# Patient Record
Sex: MALE | Race: WHITE
[De-identification: names, ages, dates, MRNs, and addresses within clinical notes are randomized per-mention and may not be internally consistent; named-entity substitution may affect disease eponyms.]

---

## 2020-01-15 ENCOUNTER — HOSPITAL ENCOUNTER (INPATIENT)
Dept: HOSPITAL 54 - TELE | Age: 50
LOS: 2 days | Discharge: HOME | DRG: 140 | End: 2020-01-17
Attending: NURSE PRACTITIONER | Admitting: NURSE PRACTITIONER
Payer: MEDICAID

## 2020-01-15 VITALS — SYSTOLIC BLOOD PRESSURE: 145 MMHG | DIASTOLIC BLOOD PRESSURE: 82 MMHG

## 2020-01-15 VITALS — HEIGHT: 71 IN | BODY MASS INDEX: 27.02 KG/M2 | WEIGHT: 193 LBS

## 2020-01-15 DIAGNOSIS — J44.1: Primary | ICD-10-CM

## 2020-01-15 DIAGNOSIS — Z83.3: ICD-10-CM

## 2020-01-15 DIAGNOSIS — J40: ICD-10-CM

## 2020-01-15 DIAGNOSIS — J15.9: ICD-10-CM

## 2020-01-15 DIAGNOSIS — Z82.5: ICD-10-CM

## 2020-01-15 DIAGNOSIS — Z87.891: ICD-10-CM

## 2020-01-15 DIAGNOSIS — Z98.890: ICD-10-CM

## 2020-01-15 PROCEDURE — G0378 HOSPITAL OBSERVATION PER HR: HCPCS

## 2020-01-15 NOTE — NUR
ADMISSION

48 y/o male admitted for shortness of breath. Place on 2LPM via NC, denies anxiety. 
Ambulates independently. Skin intact. Orientation to room, unit, staff. Instructed to use 
call light for assistance, verbalized understanding.

## 2020-01-16 VITALS — SYSTOLIC BLOOD PRESSURE: 144 MMHG | DIASTOLIC BLOOD PRESSURE: 87 MMHG

## 2020-01-16 VITALS — DIASTOLIC BLOOD PRESSURE: 80 MMHG | SYSTOLIC BLOOD PRESSURE: 139 MMHG

## 2020-01-16 VITALS — SYSTOLIC BLOOD PRESSURE: 132 MMHG | DIASTOLIC BLOOD PRESSURE: 87 MMHG

## 2020-01-16 VITALS — SYSTOLIC BLOOD PRESSURE: 133 MMHG | DIASTOLIC BLOOD PRESSURE: 80 MMHG

## 2020-01-16 LAB
APPEARANCE UR: CLEAR
BASOPHILS # BLD AUTO: 0.1 /CMM (ref 0–0.2)
BASOPHILS NFR BLD AUTO: 0.4 % (ref 0–2)
BILIRUB UR QL STRIP: NEGATIVE
BUN SERPL-MCNC: 15 MG/DL (ref 7–18)
CALCIUM SERPL-MCNC: 8.6 MG/DL (ref 8.5–10.1)
CHLORIDE SERPL-SCNC: 106 MMOL/L (ref 98–107)
CHOLEST SERPL-MCNC: 179 MG/DL (ref ?–200)
CO2 SERPL-SCNC: 27 MMOL/L (ref 21–32)
COLOR UR: YELLOW
CREAT SERPL-MCNC: 0.7 MG/DL (ref 0.6–1.3)
EOSINOPHIL NFR BLD AUTO: 0 % (ref 0–6)
GLUCOSE SERPL-MCNC: 100 MG/DL (ref 74–106)
GLUCOSE UR STRIP-MCNC: (no result) MG/DL
HCT VFR BLD AUTO: 46 % (ref 39–51)
HDLC SERPL-MCNC: 89 MG/DL (ref 40–60)
HGB BLD-MCNC: 15.2 G/DL (ref 13.5–17.5)
HGB UR QL STRIP: NEGATIVE ERY/UL
KETONES UR STRIP-MCNC: NEGATIVE MG/DL
LDLC SERPL DIRECT ASSAY-MCNC: 83 MG/DL (ref 0–99)
LEUKOCYTE ESTERASE UR QL STRIP: NEGATIVE
LYMPHOCYTES NFR BLD AUTO: 0.9 /CMM (ref 0.8–4.8)
LYMPHOCYTES NFR BLD AUTO: 5 % (ref 20–44)
MAGNESIUM SERPL-MCNC: 2.2 MG/DL (ref 1.8–2.4)
MCHC RBC AUTO-ENTMCNC: 33 G/DL (ref 31–36)
MCV RBC AUTO: 88 FL (ref 80–96)
MONOCYTES NFR BLD AUTO: 0.8 /CMM (ref 0.1–1.3)
MONOCYTES NFR BLD AUTO: 4.2 % (ref 2–12)
NEUTROPHILS # BLD AUTO: 16.4 /CMM (ref 1.8–8.9)
NEUTROPHILS NFR BLD AUTO: 90.4 % (ref 43–81)
NITRITE UR QL STRIP: NEGATIVE
PH UR STRIP: 6 [PH] (ref 5–8)
PHOSPHATE SERPL-MCNC: 3.9 MG/DL (ref 2.5–4.9)
PLATELET # BLD AUTO: 365 /CMM (ref 150–450)
POTASSIUM SERPL-SCNC: 4.1 MMOL/L (ref 3.5–5.1)
PROT UR QL STRIP: NEGATIVE MG/DL
RBC # BLD AUTO: 5.23 MIL/UL (ref 4.5–6)
SODIUM SERPL-SCNC: 143 MMOL/L (ref 136–145)
TRIGL SERPL-MCNC: 45 MG/DL (ref 30–150)
TSH SERPL DL<=0.005 MIU/L-ACNC: 1.85 UIU/ML (ref 0.36–3.74)
UROBILINOGEN UR STRIP-MCNC: 0.2 EU/DL
WBC NRBC COR # BLD AUTO: 18.2 K/UL (ref 4.3–11)

## 2020-01-16 RX ADMIN — AZITHROMYCIN DIHYDRATE SCH MG: 250 TABLET, FILM COATED ORAL at 12:01

## 2020-01-16 RX ADMIN — ALBUTEROL SULFATE SCH MG: 2.5 SOLUTION RESPIRATORY (INHALATION) at 20:22

## 2020-01-16 RX ADMIN — ENOXAPARIN SODIUM SCH MG: 40 INJECTION SUBCUTANEOUS at 21:47

## 2020-01-16 RX ADMIN — ENOXAPARIN SODIUM SCH MG: 40 INJECTION SUBCUTANEOUS at 01:27

## 2020-01-16 RX ADMIN — ALBUTEROL SULFATE SCH MG: 2.5 SOLUTION RESPIRATORY (INHALATION) at 11:46

## 2020-01-16 RX ADMIN — Medication SCH MG: at 20:22

## 2020-01-16 RX ADMIN — DEXTROSE MONOHYDRATE SCH MLS/HR: 50 INJECTION, SOLUTION INTRAVENOUS at 11:53

## 2020-01-16 RX ADMIN — Medication SCH MG: at 16:31

## 2020-01-16 RX ADMIN — Medication SCH MG: at 08:12

## 2020-01-16 RX ADMIN — Medication SCH MG: at 11:46

## 2020-01-16 RX ADMIN — ALBUTEROL SULFATE SCH MG: 2.5 SOLUTION RESPIRATORY (INHALATION) at 16:31

## 2020-01-16 RX ADMIN — ALBUTEROL SULFATE SCH MG: 2.5 SOLUTION RESPIRATORY (INHALATION) at 08:12

## 2020-01-16 NOTE — NUR
END OF SHIFT REPORT

Patient in bed, awake, A/O x4. On supplemental Oxygen at 2LPM via NC, shortness of breath 
with exertion, denies at rest. Ambulates independently, voiding without difficulty. Lab 
today as planned, MRSA surveillance specimen send to lab. Will endorse to Oncoming RN.

## 2020-01-16 NOTE — NUR
CHANGE OF SHIFT REPORT

Patient in bed, awake. On supplemental Oxygen at 2LPM via NC, tolerating well. Appears 
comfortable, denies chest pain. Urine, sputum specimen to be collected. Maintained safety.

## 2020-01-16 NOTE — NUR
Patient lives with family in [place]. He is ambulatory and independent with adl's. Has 
no DME or homehealth reported. His family will provide ride when discharge. 

-------------------------------------------------------------------------------

Addendum: 01/16/20 at 2131 by OC BROOKE RN

-------------------------------------------------------------------------------

Amended: Links added.

## 2020-01-17 VITALS — SYSTOLIC BLOOD PRESSURE: 116 MMHG | DIASTOLIC BLOOD PRESSURE: 77 MMHG

## 2020-01-17 LAB
BASOPHILS # BLD AUTO: 0 /CMM (ref 0–0.2)
BASOPHILS NFR BLD AUTO: 0.1 % (ref 0–2)
BUN SERPL-MCNC: 20 MG/DL (ref 7–18)
CALCIUM SERPL-MCNC: 8.8 MG/DL (ref 8.5–10.1)
CHLORIDE SERPL-SCNC: 101 MMOL/L (ref 98–107)
CO2 SERPL-SCNC: 27 MMOL/L (ref 21–32)
CREAT SERPL-MCNC: 0.7 MG/DL (ref 0.6–1.3)
EOSINOPHIL NFR BLD AUTO: 0 % (ref 0–6)
GLUCOSE SERPL-MCNC: 111 MG/DL (ref 74–106)
HCT VFR BLD AUTO: 47 % (ref 39–51)
HGB BLD-MCNC: 15.6 G/DL (ref 13.5–17.5)
LYMPHOCYTES NFR BLD AUTO: 1 /CMM (ref 0.8–4.8)
LYMPHOCYTES NFR BLD AUTO: 5.2 % (ref 20–44)
MAGNESIUM SERPL-MCNC: 2.2 MG/DL (ref 1.8–2.4)
MCHC RBC AUTO-ENTMCNC: 33 G/DL (ref 31–36)
MCV RBC AUTO: 88 FL (ref 80–96)
MONOCYTES NFR BLD AUTO: 0.8 /CMM (ref 0.1–1.3)
MONOCYTES NFR BLD AUTO: 4.1 % (ref 2–12)
NEUTROPHILS # BLD AUTO: 17.6 /CMM (ref 1.8–8.9)
NEUTROPHILS NFR BLD AUTO: 90.6 % (ref 43–81)
PHOSPHATE SERPL-MCNC: 3 MG/DL (ref 2.5–4.9)
PLATELET # BLD AUTO: 373 /CMM (ref 150–450)
POTASSIUM SERPL-SCNC: 4.2 MMOL/L (ref 3.5–5.1)
RBC # BLD AUTO: 5.33 MIL/UL (ref 4.5–6)
SODIUM SERPL-SCNC: 137 MMOL/L (ref 136–145)
WBC NRBC COR # BLD AUTO: 19.5 K/UL (ref 4.3–11)

## 2020-01-17 RX ADMIN — AZITHROMYCIN DIHYDRATE SCH MG: 250 TABLET, FILM COATED ORAL at 11:53

## 2020-01-17 RX ADMIN — Medication SCH MG: at 08:27

## 2020-01-17 RX ADMIN — DEXTROSE MONOHYDRATE SCH MLS/HR: 50 INJECTION, SOLUTION INTRAVENOUS at 11:06

## 2020-01-17 RX ADMIN — Medication SCH MG: at 11:57

## 2020-01-17 RX ADMIN — ALBUTEROL SULFATE SCH MG: 2.5 SOLUTION RESPIRATORY (INHALATION) at 08:27

## 2020-01-17 RX ADMIN — ALBUTEROL SULFATE SCH MG: 2.5 SOLUTION RESPIRATORY (INHALATION) at 11:57

## 2020-01-17 NOTE — NUR
m/s lvn: discharged



discharged home accompanied by father via private car in stable condition with all valuables 
and d'c papers.

## 2020-01-17 NOTE — NUR
ELIZABETHW and pt's RN Olvin met with the pt. bedside to discuss discharge plan. Pt. denies being 
homeless and lives with this mother in [place]. Pt stated, his mother will be coming to 
pick him up.

## 2020-01-17 NOTE — NUR
m/s lvn: md visit



seen and examined by natalee montalvo (acnp) at this time. pt for discharge planning home 
today. discharge instructions with prescriptions given by natalee pt verbalized 
understanding. informed pt to wait to get his flu vaccine once he feels better per joseph, pt 
verbalized understanding.

## 2020-01-17 NOTE — NUR
m/s lvn: notes



discharge instructions given with e script prescriptions to pt and verbalized understanding. 
mother will pick him up in an hour per telephone conversation. pt made aware.

## 2020-01-17 NOTE — NUR
m/s lvn: notes



father here to  the pt, mother couldn't make it due to previous appointment per 
family. h/l removed with tip intact. pt getting ready. all belongings/valuables returned to 
pt.

## 2020-01-17 NOTE — NUR
m/s lvn: initial assessment



received pt in bed awake, a/ox4. no c/o pain, sob, or any discomfort. pt feels much better 
as stated. no wheezing to todd lobes noted at this time. instructed to call for assistance. 
will continue to monitor.

## 2020-01-17 NOTE — NUR
m/s lvn: notes



still awaiting for family to pick him up. pt resting comfortable. no distress noted.

## 2020-01-17 NOTE — NUR
END OF SHIFT REPORT

Patient in bed, awake, A/O x4. Remains on supplemental Oxygen at 2LPM via NC, denies 
shortness of breath at rest and with exertion. IV antibiotic as scheduled, no acute events 
overnight. Respiratory cx G/S pending, sputum not available at this time. Will endorse to 
Oncoming RN.

## 2020-04-01 ENCOUNTER — HOSPITAL ENCOUNTER (EMERGENCY)
Dept: HOSPITAL 54 - ER | Age: 50
Discharge: HOME | End: 2020-04-01
Payer: MEDICAID

## 2020-04-01 VITALS — DIASTOLIC BLOOD PRESSURE: 85 MMHG | SYSTOLIC BLOOD PRESSURE: 139 MMHG

## 2020-04-01 VITALS — BODY MASS INDEX: 28.14 KG/M2 | HEIGHT: 71 IN | WEIGHT: 201 LBS

## 2020-04-01 DIAGNOSIS — Z79.899: ICD-10-CM

## 2020-04-01 DIAGNOSIS — R06.00: Primary | ICD-10-CM

## 2020-04-01 LAB
ALBUMIN SERPL BCP-MCNC: 3.5 G/DL (ref 3.4–5)
ALP SERPL-CCNC: 72 U/L (ref 46–116)
ALT SERPL W P-5'-P-CCNC: 13 U/L (ref 12–78)
AST SERPL W P-5'-P-CCNC: 3 U/L (ref 15–37)
BASOPHILS # BLD AUTO: 0.1 /CMM (ref 0–0.2)
BASOPHILS NFR BLD AUTO: 1.3 % (ref 0–2)
BILIRUB DIRECT SERPL-MCNC: 0.1 MG/DL (ref 0–0.2)
BILIRUB SERPL-MCNC: 0.5 MG/DL (ref 0.2–1)
BUN SERPL-MCNC: 17 MG/DL (ref 7–18)
CALCIUM SERPL-MCNC: 9 MG/DL (ref 8.5–10.1)
CHLORIDE SERPL-SCNC: 106 MMOL/L (ref 98–107)
CO2 SERPL-SCNC: 32 MMOL/L (ref 21–32)
CREAT SERPL-MCNC: 1.3 MG/DL (ref 0.6–1.3)
EOSINOPHIL NFR BLD AUTO: 6.7 % (ref 0–6)
GLUCOSE SERPL-MCNC: 170 MG/DL (ref 74–106)
HCT VFR BLD AUTO: 44 % (ref 39–51)
HGB BLD-MCNC: 15.1 G/DL (ref 13.5–17.5)
LYMPHOCYTES NFR BLD AUTO: 1.7 /CMM (ref 0.8–4.8)
LYMPHOCYTES NFR BLD AUTO: 18.6 % (ref 20–44)
MCHC RBC AUTO-ENTMCNC: 34 G/DL (ref 31–36)
MCV RBC AUTO: 88 FL (ref 80–96)
MONOCYTES NFR BLD AUTO: 0.5 /CMM (ref 0.1–1.3)
MONOCYTES NFR BLD AUTO: 5.6 % (ref 2–12)
NEUTROPHILS # BLD AUTO: 6.3 /CMM (ref 1.8–8.9)
NEUTROPHILS NFR BLD AUTO: 67.8 % (ref 43–81)
NT-PROBNP SERPL-MCNC: 46 PG/ML (ref 0–125)
PLATELET # BLD AUTO: 346 /CMM (ref 150–450)
POTASSIUM SERPL-SCNC: 3 MMOL/L (ref 3.5–5.1)
PROT SERPL-MCNC: 7.2 G/DL (ref 6.4–8.2)
RBC # BLD AUTO: 5.05 MIL/UL (ref 4.5–6)
SODIUM SERPL-SCNC: 142 MMOL/L (ref 136–145)
WBC NRBC COR # BLD AUTO: 9.2 K/UL (ref 4.3–11)

## 2020-04-01 PROCEDURE — 71045 X-RAY EXAM CHEST 1 VIEW: CPT

## 2020-04-01 PROCEDURE — 83880 ASSAY OF NATRIURETIC PEPTIDE: CPT

## 2020-04-01 PROCEDURE — 85025 COMPLETE CBC W/AUTO DIFF WBC: CPT

## 2020-04-01 PROCEDURE — 80076 HEPATIC FUNCTION PANEL: CPT

## 2020-04-01 PROCEDURE — 93005 ELECTROCARDIOGRAM TRACING: CPT

## 2020-04-01 PROCEDURE — 99285 EMERGENCY DEPT VISIT HI MDM: CPT

## 2020-04-01 PROCEDURE — 36415 COLL VENOUS BLD VENIPUNCTURE: CPT

## 2020-04-01 PROCEDURE — 80048 BASIC METABOLIC PNL TOTAL CA: CPT

## 2020-04-01 PROCEDURE — 84484 ASSAY OF TROPONIN QUANT: CPT

## 2020-04-16 ENCOUNTER — HOSPITAL ENCOUNTER (EMERGENCY)
Dept: HOSPITAL 54 - ER | Age: 50
Discharge: HOME | End: 2020-04-16
Payer: MEDICAID

## 2020-04-16 VITALS — DIASTOLIC BLOOD PRESSURE: 79 MMHG | SYSTOLIC BLOOD PRESSURE: 131 MMHG

## 2020-04-16 VITALS — WEIGHT: 210 LBS | HEIGHT: 71 IN | BODY MASS INDEX: 29.4 KG/M2

## 2020-04-16 DIAGNOSIS — Z79.899: ICD-10-CM

## 2020-04-16 DIAGNOSIS — J44.9: Primary | ICD-10-CM

## 2020-04-16 LAB
ALBUMIN SERPL BCP-MCNC: 4 G/DL (ref 3.4–5)
ALP SERPL-CCNC: 77 U/L (ref 46–116)
ALT SERPL W P-5'-P-CCNC: 37 U/L (ref 12–78)
AST SERPL W P-5'-P-CCNC: 22 U/L (ref 15–37)
BASE EXCESS BLDA CALC-SCNC: -3.7 MMOL/L
BASOPHILS # BLD AUTO: 0.1 /CMM (ref 0–0.2)
BASOPHILS NFR BLD AUTO: 0.8 % (ref 0–2)
BILIRUB DIRECT SERPL-MCNC: 0.1 MG/DL (ref 0–0.2)
BILIRUB SERPL-MCNC: 0.9 MG/DL (ref 0.2–1)
BUN SERPL-MCNC: 24 MG/DL (ref 7–18)
CALCIUM SERPL-MCNC: 9.6 MG/DL (ref 8.5–10.1)
CHLORIDE SERPL-SCNC: 99 MMOL/L (ref 98–107)
CO2 SERPL-SCNC: 26 MMOL/L (ref 21–32)
CREAT SERPL-MCNC: 1.4 MG/DL (ref 0.6–1.3)
DO-HGB MFR BLDA: 46.7 MMHG
EOSINOPHIL NFR BLD AUTO: 6 % (ref 0–6)
GLUCOSE SERPL-MCNC: 144 MG/DL (ref 74–106)
HCT VFR BLD AUTO: 52 % (ref 39–51)
HGB BLD-MCNC: 17.3 G/DL (ref 13.5–17.5)
INHALED O2 CONCENTRATION: 21 %
LYMPHOCYTES NFR BLD AUTO: 1.3 /CMM (ref 0.8–4.8)
LYMPHOCYTES NFR BLD AUTO: 10.7 % (ref 20–44)
MCHC RBC AUTO-ENTMCNC: 33 G/DL (ref 31–36)
MCV RBC AUTO: 88 FL (ref 80–96)
MONOCYTES NFR BLD AUTO: 0.8 /CMM (ref 0.1–1.3)
MONOCYTES NFR BLD AUTO: 7 % (ref 2–12)
NEUTROPHILS # BLD AUTO: 9.1 /CMM (ref 1.8–8.9)
NEUTROPHILS NFR BLD AUTO: 75.5 % (ref 43–81)
PCO2 TEMP ADJ BLDA: 34.8 MMHG (ref 35–45)
PH TEMP ADJ BLDA: 7.39 [PH] (ref 7.35–7.45)
PLATELET # BLD AUTO: 343 /CMM (ref 150–450)
PO2 TEMP ADJ BLDA: 61.4 MMHG (ref 75–100)
POTASSIUM SERPL-SCNC: 3.6 MMOL/L (ref 3.5–5.1)
PROT SERPL-MCNC: 8.1 G/DL (ref 6.4–8.2)
RBC # BLD AUTO: 5.98 MIL/UL (ref 4.5–6)
SAO2 % BLDA: 92.6 % (ref 92–98.5)
SODIUM SERPL-SCNC: 138 MMOL/L (ref 136–145)
VENTILATION MODE VENT: (no result)
WBC NRBC COR # BLD AUTO: 12 K/UL (ref 4.3–11)

## 2020-04-16 PROCEDURE — 93005 ELECTROCARDIOGRAM TRACING: CPT

## 2020-04-16 PROCEDURE — 36415 COLL VENOUS BLD VENIPUNCTURE: CPT

## 2020-04-16 PROCEDURE — 71045 X-RAY EXAM CHEST 1 VIEW: CPT

## 2020-04-16 PROCEDURE — 94644 CONT INHLJ TX 1ST HOUR: CPT

## 2020-04-16 PROCEDURE — 84484 ASSAY OF TROPONIN QUANT: CPT

## 2020-04-16 PROCEDURE — 82803 BLOOD GASES ANY COMBINATION: CPT

## 2020-04-16 PROCEDURE — 96374 THER/PROPH/DIAG INJ IV PUSH: CPT

## 2020-04-16 PROCEDURE — 36600 WITHDRAWAL OF ARTERIAL BLOOD: CPT

## 2020-04-16 PROCEDURE — 99285 EMERGENCY DEPT VISIT HI MDM: CPT

## 2020-04-16 PROCEDURE — 80048 BASIC METABOLIC PNL TOTAL CA: CPT

## 2020-04-16 PROCEDURE — 80076 HEPATIC FUNCTION PANEL: CPT

## 2020-04-16 PROCEDURE — 85025 COMPLETE CBC W/AUTO DIFF WBC: CPT

## 2020-04-16 NOTE — NUR
PATIENT CAME TO ER BED 5 C/O SOB. PATIENT STATES THAT HE WAS HAVING DIFFICULTY 
BREATHING SINCE LAST NIGHT WHILE ATTEMPTING TO SLEEP. WHEEZES AUSCULTATED 
BILATERALLY UPPER AND LOWER LOBES. PATIENT STATES THAT HE WAS DIAGNOSED. AAOX4. 
BREATHING EVENLY AND UNLABORED ON ROOM AIR.

## 2020-07-25 ENCOUNTER — HOSPITAL ENCOUNTER (EMERGENCY)
Dept: HOSPITAL 54 - ER | Age: 50
Discharge: HOME | End: 2020-07-25
Payer: MEDICAID

## 2020-07-25 VITALS — BODY MASS INDEX: 30.1 KG/M2 | WEIGHT: 215 LBS | HEIGHT: 71 IN

## 2020-07-25 VITALS — SYSTOLIC BLOOD PRESSURE: 146 MMHG | DIASTOLIC BLOOD PRESSURE: 81 MMHG

## 2020-07-25 DIAGNOSIS — Z20.828: ICD-10-CM

## 2020-07-25 DIAGNOSIS — J44.9: Primary | ICD-10-CM

## 2020-07-25 DIAGNOSIS — Z87.891: ICD-10-CM

## 2020-07-25 PROCEDURE — 71045 X-RAY EXAM CHEST 1 VIEW: CPT

## 2020-07-25 PROCEDURE — 94640 AIRWAY INHALATION TREATMENT: CPT

## 2020-07-25 PROCEDURE — 99284 EMERGENCY DEPT VISIT MOD MDM: CPT

## 2020-07-25 NOTE — NUR
came in for  "sob since yesterday, run out of inhaler x 3 days" , to ER bed 8, 
hooked to monitor, 97% on RA, hx of COPD. changed to hosp gown, warm blanket 
provided, Dr Barkley at bedside

## 2020-09-10 ENCOUNTER — HOSPITAL ENCOUNTER (EMERGENCY)
Dept: HOSPITAL 54 - ER | Age: 50
LOS: 1 days | Discharge: HOME | End: 2020-09-11
Payer: MEDICAID

## 2020-09-10 VITALS — WEIGHT: 199 LBS | HEIGHT: 71 IN | BODY MASS INDEX: 27.86 KG/M2

## 2020-09-10 DIAGNOSIS — Z79.899: ICD-10-CM

## 2020-09-10 DIAGNOSIS — J44.9: Primary | ICD-10-CM

## 2020-09-10 DIAGNOSIS — Z87.891: ICD-10-CM

## 2020-09-10 PROCEDURE — 94644 CONT INHLJ TX 1ST HOUR: CPT

## 2020-09-10 PROCEDURE — 99285 EMERGENCY DEPT VISIT HI MDM: CPT

## 2020-09-10 RX ADMIN — Medication STA MG: at 22:33

## 2020-09-10 RX ADMIN — ALBUTEROL SULFATE STA MG: 2.5 SOLUTION RESPIRATORY (INHALATION) at 22:33

## 2020-09-10 NOTE — NUR
PT BIBSELF C/O PRODUCTIVE COUGH, WHITE SPUTUM, WITH DIFFICULTY BREATHING. PT 
STATES HE WAS ATTEMPTING TO USE ALBUTEROL AT HOME, NO RELIEF. O2 SAT 92% ON 
ROOM AIR. PT AAOX4. RESPIRATIONS EVEN AND UNLABORED. SKIN WARM AND INTACT. NO 
ACUTE DISTRESS NOTED AT THIS TIME. WILL CONTINUE TO MONITOR.

## 2020-09-11 VITALS — DIASTOLIC BLOOD PRESSURE: 91 MMHG | SYSTOLIC BLOOD PRESSURE: 148 MMHG

## 2020-09-25 ENCOUNTER — HOSPITAL ENCOUNTER (EMERGENCY)
Dept: HOSPITAL 54 - ER | Age: 50
Discharge: HOME | End: 2020-09-25
Payer: MEDICAID

## 2020-09-25 VITALS — SYSTOLIC BLOOD PRESSURE: 137 MMHG | DIASTOLIC BLOOD PRESSURE: 90 MMHG

## 2020-09-25 VITALS — BODY MASS INDEX: 26.6 KG/M2 | WEIGHT: 190 LBS | HEIGHT: 71 IN

## 2020-09-25 DIAGNOSIS — J44.9: Primary | ICD-10-CM

## 2020-09-25 DIAGNOSIS — Z79.899: ICD-10-CM

## 2020-09-25 DIAGNOSIS — Z87.891: ICD-10-CM

## 2020-09-25 PROCEDURE — 99285 EMERGENCY DEPT VISIT HI MDM: CPT

## 2020-09-25 PROCEDURE — 94644 CONT INHLJ TX 1ST HOUR: CPT

## 2020-09-25 RX ADMIN — Medication ONE MG: at 08:09

## 2020-09-25 RX ADMIN — ALBUTEROL SULFATE ONE MG: 2.5 SOLUTION RESPIRATORY (INHALATION) at 08:09

## 2020-09-25 NOTE — NUR
PT AAOX4. BIBSELF C/O NEEDING BREATHING TREATMENT DUE TO HIM HAVING COPD 
EXACERBATION. NO ACUTE DISTRESS NOTED. AWAITING MD FOR EVAL AND ORDERS.

## 2020-10-17 ENCOUNTER — HOSPITAL ENCOUNTER (INPATIENT)
Dept: HOSPITAL 54 - ER | Age: 50
LOS: 3 days | Discharge: HOME | DRG: 140 | End: 2020-10-20
Attending: NURSE PRACTITIONER | Admitting: INTERNAL MEDICINE
Payer: MEDICAID

## 2020-10-17 VITALS — DIASTOLIC BLOOD PRESSURE: 94 MMHG | SYSTOLIC BLOOD PRESSURE: 133 MMHG

## 2020-10-17 VITALS — WEIGHT: 198 LBS | BODY MASS INDEX: 27.72 KG/M2 | HEIGHT: 71 IN

## 2020-10-17 VITALS — SYSTOLIC BLOOD PRESSURE: 154 MMHG | DIASTOLIC BLOOD PRESSURE: 86 MMHG

## 2020-10-17 VITALS — DIASTOLIC BLOOD PRESSURE: 62 MMHG | SYSTOLIC BLOOD PRESSURE: 137 MMHG

## 2020-10-17 VITALS — SYSTOLIC BLOOD PRESSURE: 151 MMHG | DIASTOLIC BLOOD PRESSURE: 93 MMHG

## 2020-10-17 DIAGNOSIS — E86.1: ICD-10-CM

## 2020-10-17 DIAGNOSIS — J44.1: Primary | ICD-10-CM

## 2020-10-17 DIAGNOSIS — D72.828: ICD-10-CM

## 2020-10-17 DIAGNOSIS — J96.01: ICD-10-CM

## 2020-10-17 DIAGNOSIS — E22.2: ICD-10-CM

## 2020-10-17 DIAGNOSIS — J44.0: ICD-10-CM

## 2020-10-17 DIAGNOSIS — Z87.891: ICD-10-CM
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
## 2020-10-17 DIAGNOSIS — J20.9: ICD-10-CM

## 2020-10-17 DIAGNOSIS — R63.1: ICD-10-CM

## 2020-10-17 DIAGNOSIS — T38.0X5A: ICD-10-CM

## 2020-10-17 LAB
ALBUMIN SERPL BCP-MCNC: 3.4 G/DL (ref 3.4–5)
ALP SERPL-CCNC: 71 U/L (ref 46–116)
ALT SERPL W P-5'-P-CCNC: 30 U/L (ref 12–78)
AST SERPL W P-5'-P-CCNC: 17 U/L (ref 15–37)
BASOPHILS # BLD AUTO: 0.1 /CMM (ref 0–0.2)
BASOPHILS NFR BLD AUTO: 1.4 % (ref 0–2)
BILIRUB DIRECT SERPL-MCNC: 0.1 MG/DL (ref 0–0.2)
BILIRUB SERPL-MCNC: 0.6 MG/DL (ref 0.2–1)
BUN SERPL-MCNC: 15 MG/DL (ref 7–18)
CALCIUM SERPL-MCNC: 8.9 MG/DL (ref 8.5–10.1)
CHLORIDE SERPL-SCNC: 98 MMOL/L (ref 98–107)
CO2 SERPL-SCNC: 26 MMOL/L (ref 21–32)
CREAT SERPL-MCNC: 1.1 MG/DL (ref 0.6–1.3)
EOSINOPHIL NFR BLD AUTO: 7.7 % (ref 0–6)
GLUCOSE SERPL-MCNC: 139 MG/DL (ref 74–106)
HCT VFR BLD AUTO: 49 % (ref 39–51)
HGB BLD-MCNC: 16 G/DL (ref 13.5–17.5)
LYMPHOCYTES NFR BLD AUTO: 1.4 /CMM (ref 0.8–4.8)
LYMPHOCYTES NFR BLD AUTO: 17.2 % (ref 20–44)
MCHC RBC AUTO-ENTMCNC: 33 G/DL (ref 31–36)
MCV RBC AUTO: 85 FL (ref 80–96)
MONOCYTES NFR BLD AUTO: 0.5 /CMM (ref 0.1–1.3)
MONOCYTES NFR BLD AUTO: 6.5 % (ref 2–12)
NEUTROPHILS # BLD AUTO: 5.5 /CMM (ref 1.8–8.9)
NEUTROPHILS NFR BLD AUTO: 67.2 % (ref 43–81)
NT-PROBNP SERPL-MCNC: 64 PG/ML (ref 0–125)
PLATELET # BLD AUTO: 321 /CMM (ref 150–450)
POTASSIUM SERPL-SCNC: 3.7 MMOL/L (ref 3.5–5.1)
PROT SERPL-MCNC: 7.3 G/DL (ref 6.4–8.2)
RBC # BLD AUTO: 5.69 MIL/UL (ref 4.5–6)
SODIUM SERPL-SCNC: 124 MMOL/L (ref 136–145)
WBC NRBC COR # BLD AUTO: 8.2 K/UL (ref 4.3–11)

## 2020-10-17 PROCEDURE — A4216 STERILE WATER/SALINE, 10 ML: HCPCS

## 2020-10-17 PROCEDURE — C9803 HOPD COVID-19 SPEC COLLECT: HCPCS

## 2020-10-17 PROCEDURE — G0378 HOSPITAL OBSERVATION PER HR: HCPCS

## 2020-10-17 RX ADMIN — ALBUTEROL SULFATE PRN MG: 2.5 SOLUTION RESPIRATORY (INHALATION) at 23:43

## 2020-10-17 RX ADMIN — Medication PRN MG: at 23:43

## 2020-10-17 RX ADMIN — ALBUTEROL SULFATE PRN MG: 2.5 SOLUTION RESPIRATORY (INHALATION) at 16:19

## 2020-10-17 RX ADMIN — LEVOFLOXACIN SCH MLS/HR: 500 INJECTION, SOLUTION INTRAVENOUS at 18:59

## 2020-10-17 RX ADMIN — Medication PRN MG: at 16:19

## 2020-10-17 RX ADMIN — ZOLPIDEM TARTRATE PRN MG: 5 TABLET, FILM COATED ORAL at 23:28

## 2020-10-17 NOTE — NUR
TELE RN CLOSING NOTES



PT RESTING IN BED WATCHING TV @ THIS TIME. A/O X4. ABLE TO MAKE NEEDS KNOWN. ON PRN 02 VIA 
N/C @ 1LPM AT THIS TIME, TOLERATING WELL, BREATHING EVEN AND UNLABORED. TELEMONITORING SHOWS 
NSR WITH HR ON THE 80-90'S, NO C/O CARDIAC DISTRESS VOICED. IV ACCESS ON RIGHT HAND G#20 
INTACT AND PATENT, NO S/S OF INFILTRATION AT SITE NOTED. SAFETY MEASURES KEPT IN PLACE: BED  
IN LOWEST LOCKED POSITION WITH SR UPX2. CALL LIGHT W/IN EASY REACH OF PT. WILL ENDORSE GINO 
TO INCOMING NIGHT NURSE.

## 2020-10-17 NOTE — NUR
RN NOTES



PT C/O SOB AND PRODUCTIVE COUGH WITH ALIGHTLY THICK WHITTISH SECRETIONS NOTED. 02 VIA N/C @ 
1LPM ADMINISTERED. BREATHING TX GIVEN BY RT. DR RHOADES ON UNIT AND MADE AWARE. WILL 
CONTINUE TO MONITOR

## 2020-10-17 NOTE — NUR
PT AAOX4. BIBSELF C/O "I'M HAVING COPD EXACERBATION, I NEED A BREATHING TX. PT 
C/O SOB AND SAT 89% UPON PLACING HIM ON MONITOR. MD AWARE. BREATHING TREATMENT 
WILL BE GIVEN. RT CALLED. NO ACUTE DISTRESS NOTED. VSS.

## 2020-10-17 NOTE — NUR
RN NOTES

RECEIVED PT. AWAKE ON BED,. A/OX4, SR WITH BBB ON TELE MONITOR HR-85, NOT IN DISTRESS, 
DENIES PAIN, BED IN LOCKED POSITION, CALL LIGHT WITHIN REACH, [id number], CONTINUE TO 
MONITOR

## 2020-10-17 NOTE — NUR
TELE RN ADMITTING NOTES



ADMITTED PT TO UNIT VIA [facility] AT 0950. PT ABLE TO AMBULATE WITH STEADY GAIT. A/O X4. ABLE 
TO MAKE NEEDS KNOWN, DENIES PAIN OR SOB AT THIS TIME. PT ORIENTED TO STAFF AND ROOM. ON ROOM 
AIR, TOLERATING WELL. V/S TAKEN, STABLE AND RECORDED. SKIN IS INTACT. IV ACCESS NOTED ON 
RIGHT HAND G#20 INTACT AND PATENT. PT PLACED ON TELEMONITORING WITH CURRENT READING OF NSR 
AND HR ON THE 80'S, NO C/O CARDIAC DISTRESS VOICED. SAFETY MEASURES INITIATED: BED PLACED IN 
LOWEST LOCKED POSITION WITH SR UPX2. CALL LIGHT PLACED W/IN EASY REACH OF PT. WILL CONTINUE 
TO MONITOR PT ACCORDINGLY.

## 2020-10-18 VITALS — SYSTOLIC BLOOD PRESSURE: 159 MMHG | DIASTOLIC BLOOD PRESSURE: 88 MMHG

## 2020-10-18 VITALS — DIASTOLIC BLOOD PRESSURE: 88 MMHG | SYSTOLIC BLOOD PRESSURE: 133 MMHG

## 2020-10-18 VITALS — DIASTOLIC BLOOD PRESSURE: 104 MMHG | SYSTOLIC BLOOD PRESSURE: 148 MMHG

## 2020-10-18 VITALS — DIASTOLIC BLOOD PRESSURE: 81 MMHG | SYSTOLIC BLOOD PRESSURE: 138 MMHG

## 2020-10-18 VITALS — DIASTOLIC BLOOD PRESSURE: 95 MMHG | SYSTOLIC BLOOD PRESSURE: 142 MMHG

## 2020-10-18 VITALS — DIASTOLIC BLOOD PRESSURE: 81 MMHG | SYSTOLIC BLOOD PRESSURE: 130 MMHG

## 2020-10-18 LAB
BASOPHILS # BLD AUTO: 0.1 /CMM (ref 0–0.2)
BASOPHILS NFR BLD AUTO: 0.5 % (ref 0–2)
BUN SERPL-MCNC: 15 MG/DL (ref 7–18)
CALCIUM SERPL-MCNC: 9.1 MG/DL (ref 8.5–10.1)
CHLORIDE SERPL-SCNC: 102 MMOL/L (ref 98–107)
CHOLEST SERPL-MCNC: 199 MG/DL (ref ?–200)
CO2 SERPL-SCNC: 26 MMOL/L (ref 21–32)
CREAT SERPL-MCNC: 1.1 MG/DL (ref 0.6–1.3)
EOSINOPHIL NFR BLD AUTO: 0.3 % (ref 0–6)
GLUCOSE SERPL-MCNC: 141 MG/DL (ref 74–106)
HCT VFR BLD AUTO: 50 % (ref 39–51)
HDLC SERPL-MCNC: 85 MG/DL (ref 40–60)
HGB BLD-MCNC: 16.6 G/DL (ref 13.5–17.5)
LDLC SERPL DIRECT ASSAY-MCNC: 102 MG/DL (ref 0–99)
LYMPHOCYTES NFR BLD AUTO: 0.5 /CMM (ref 0.8–4.8)
LYMPHOCYTES NFR BLD AUTO: 2.8 % (ref 20–44)
MAGNESIUM SERPL-MCNC: 2.5 MG/DL (ref 1.8–2.4)
MCHC RBC AUTO-ENTMCNC: 33 G/DL (ref 31–36)
MCV RBC AUTO: 86 FL (ref 80–96)
MONOCYTES NFR BLD AUTO: 0.3 /CMM (ref 0.1–1.3)
MONOCYTES NFR BLD AUTO: 1.6 % (ref 2–12)
NEUTROPHILS # BLD AUTO: 15.3 /CMM (ref 1.8–8.9)
NEUTROPHILS NFR BLD AUTO: 94.8 % (ref 43–81)
PHOSPHATE SERPL-MCNC: 4.4 MG/DL (ref 2.5–4.9)
PLATELET # BLD AUTO: 345 /CMM (ref 150–450)
POTASSIUM SERPL-SCNC: 4.3 MMOL/L (ref 3.5–5.1)
RBC # BLD AUTO: 5.79 MIL/UL (ref 4.5–6)
SODIUM SERPL-SCNC: 137 MMOL/L (ref 136–145)
TRIGL SERPL-MCNC: 45 MG/DL (ref 30–150)
TSH SERPL DL<=0.005 MIU/L-ACNC: 0.55 UIU/ML (ref 0.36–3.74)
WBC NRBC COR # BLD AUTO: 16.1 K/UL (ref 4.3–11)

## 2020-10-18 RX ADMIN — ALBUTEROL SULFATE PRN MG: 2.5 SOLUTION RESPIRATORY (INHALATION) at 11:03

## 2020-10-18 RX ADMIN — Medication PRN MG: at 23:48

## 2020-10-18 RX ADMIN — ALBUTEROL SULFATE PRN MG: 2.5 SOLUTION RESPIRATORY (INHALATION) at 23:47

## 2020-10-18 RX ADMIN — LEVOFLOXACIN SCH MLS/HR: 500 INJECTION, SOLUTION INTRAVENOUS at 18:18

## 2020-10-18 RX ADMIN — PANTOPRAZOLE SODIUM SCH MG: 40 TABLET, DELAYED RELEASE ORAL at 08:36

## 2020-10-18 RX ADMIN — ALBUTEROL SULFATE PRN MG: 2.5 SOLUTION RESPIRATORY (INHALATION) at 17:15

## 2020-10-18 RX ADMIN — Medication PRN MG: at 11:03

## 2020-10-18 RX ADMIN — Medication PRN MG: at 17:15

## 2020-10-18 NOTE — NUR
RN  NOTE



PATIENT ALERT AND ORIENTED X4. DENIES PAIN. RECEIVING OXYGEN AT 2L/MIN VIA NASAL CANNULA AND 
SATURATION IS AT 92%. DENIES SOB. EXTERNAL TELE BOX READING IS SR 97. PATIENT IN NO APPARENT 
DISTRESS. RIGHT HAND G 20 PATENT AND SALINE LOCKED. BED LOW AND ;LOCKED. SIDE RIALS UP X2. 
CALL LIGHT WITHIN REACH. WILL ENDORSE TO NIGHT SHIFT.

## 2020-10-18 NOTE — NUR
RN  NOTE



THE PATIENT IS RECEIVED IN BED. THE PATIENT IS ALERT AND ORIENTED X4. DENIES PAIN. PATIENT 
IS RECEIVING OXYGEN AT 12L/MIN VI NASAL CANNULA AND DENIES SOB. RESPIRATION REGULAR AND 
UNLABORED. THE PATIENT IN NO APPARENT DISTRESS. RIGHT HAND G 20 PATENT AND SALINE LOCKED. 
BED LOW AND LOCKED. SIDE RAILS UP X2. CALL LIGHT WITHIN REACH. WILL CONTINUE TO MONITOR.

## 2020-10-18 NOTE — NUR
RN NOTES

RECEIVED PT. SLEEPING BUT AROUSABLE, NOT IN DISTRESS, SR ON TELE MONITOR HR-75, NOT IN 
DISTRESS, NO PAIN NOTED, BED IN LOCKED POSITION, CALL LIGHT WITHIN REACH, [id number], 
CONTINUE TO MONITOR

## 2020-10-18 NOTE — NUR
RN NOTES

SLEEPING BUT AROUSABLE, NOT IN DISTRESS, NO PAIN NOTED, CALL LIGHT WITHIN REACH, 
SIDERAILSUPX2, PT. NEEDS ATTENDED

## 2020-10-19 VITALS — DIASTOLIC BLOOD PRESSURE: 76 MMHG | SYSTOLIC BLOOD PRESSURE: 118 MMHG

## 2020-10-19 VITALS — SYSTOLIC BLOOD PRESSURE: 135 MMHG | DIASTOLIC BLOOD PRESSURE: 85 MMHG

## 2020-10-19 VITALS — SYSTOLIC BLOOD PRESSURE: 130 MMHG | DIASTOLIC BLOOD PRESSURE: 91 MMHG

## 2020-10-19 VITALS — DIASTOLIC BLOOD PRESSURE: 84 MMHG | SYSTOLIC BLOOD PRESSURE: 146 MMHG

## 2020-10-19 VITALS — DIASTOLIC BLOOD PRESSURE: 58 MMHG | SYSTOLIC BLOOD PRESSURE: 94 MMHG

## 2020-10-19 VITALS — SYSTOLIC BLOOD PRESSURE: 143 MMHG | DIASTOLIC BLOOD PRESSURE: 75 MMHG

## 2020-10-19 LAB
BASOPHILS # BLD AUTO: 0.1 /CMM (ref 0–0.2)
BASOPHILS NFR BLD AUTO: 0.4 % (ref 0–2)
BUN SERPL-MCNC: 21 MG/DL (ref 7–18)
CALCIUM SERPL-MCNC: 9.4 MG/DL (ref 8.5–10.1)
CHLORIDE SERPL-SCNC: 102 MMOL/L (ref 98–107)
CO2 SERPL-SCNC: 28 MMOL/L (ref 21–32)
CREAT SERPL-MCNC: 1.1 MG/DL (ref 0.6–1.3)
EOSINOPHIL NFR BLD AUTO: 0 % (ref 0–6)
GLUCOSE SERPL-MCNC: 130 MG/DL (ref 74–106)
HCT VFR BLD AUTO: 50 % (ref 39–51)
HGB BLD-MCNC: 16.1 G/DL (ref 13.5–17.5)
LYMPHOCYTES NFR BLD AUTO: 0.7 /CMM (ref 0.8–4.8)
LYMPHOCYTES NFR BLD AUTO: 3 % (ref 20–44)
MAGNESIUM SERPL-MCNC: 2.6 MG/DL (ref 1.8–2.4)
MCHC RBC AUTO-ENTMCNC: 33 G/DL (ref 31–36)
MCV RBC AUTO: 86 FL (ref 80–96)
MONOCYTES NFR BLD AUTO: 0.6 /CMM (ref 0.1–1.3)
MONOCYTES NFR BLD AUTO: 2.8 % (ref 2–12)
NEUTROPHILS # BLD AUTO: 21.2 /CMM (ref 1.8–8.9)
NEUTROPHILS NFR BLD AUTO: 93.8 % (ref 43–81)
OSMOLALITY UR: 589 MOS/KG (ref 340–1090)
PHOSPHATE SERPL-MCNC: 3.7 MG/DL (ref 2.5–4.9)
PLATELET # BLD AUTO: 372 /CMM (ref 150–450)
POTASSIUM SERPL-SCNC: 4.9 MMOL/L (ref 3.5–5.1)
RBC # BLD AUTO: 5.74 MIL/UL (ref 4.5–6)
SODIUM SERPL-SCNC: 139 MMOL/L (ref 136–145)
SODIUM UR-SCNC: 76 MMOL/L (ref 40–220)
WBC NRBC COR # BLD AUTO: 22.7 K/UL (ref 4.3–11)

## 2020-10-19 RX ADMIN — Medication SCH MG: at 16:11

## 2020-10-19 RX ADMIN — ALBUTEROL SULFATE SCH MG: 2.5 SOLUTION RESPIRATORY (INHALATION) at 16:11

## 2020-10-19 RX ADMIN — PANTOPRAZOLE SODIUM SCH MG: 40 TABLET, DELAYED RELEASE ORAL at 08:31

## 2020-10-19 RX ADMIN — ALBUTEROL SULFATE SCH MG: 2.5 SOLUTION RESPIRATORY (INHALATION) at 11:30

## 2020-10-19 RX ADMIN — ZOLPIDEM TARTRATE PRN MG: 5 TABLET, FILM COATED ORAL at 00:14

## 2020-10-19 RX ADMIN — ALBUTEROL SULFATE SCH MG: 2.5 SOLUTION RESPIRATORY (INHALATION) at 20:20

## 2020-10-19 RX ADMIN — LEVOFLOXACIN SCH MLS/HR: 500 INJECTION, SOLUTION INTRAVENOUS at 17:51

## 2020-10-19 RX ADMIN — Medication SCH MG: at 11:30

## 2020-10-19 RX ADMIN — Medication SCH MG: at 20:20

## 2020-10-19 NOTE — NUR
TELE RN NOTES



PATIENT RESTING COMFORTABLY IN BED WATCHING TV. ALERT AND ORIENTED X4. NO S/S OF RESPIRATORY 
DISTRESS.  REMAIN ON O2 AT 2L/MIN VIA NC THEO WELL. DENIES ANY C/O PAIN NOR DISCOMFORT AT 
THIS TIME. LEFT FA # 22 SL INTACT AND PATENT. BED IN LOWEST POSITION, LOCKED. CALL LIGHT 
WITHIN REACH. ABLE TO VERBALIZE NEEDS. IN NO APPARENT DISTRESS.

## 2020-10-19 NOTE — NUR
RN NOTES

SLEEPING BUT AROUSABLE, NOT IN DISTRESS, DENIES PAIN, MORNING CARE RENDERED, CALL LIGHT 
WITHIN REACH, [id number], PT. NEEDS ATTENDED

## 2020-10-19 NOTE — NUR
TELE RN NOTES



RECEIVED PATIENT IN BED ASLEEP. AROUSABLE TO VERBAL AND TACTILE STIMULI. HOB ELEVATED. NO 
SOB. REMAIN ON O2 AT 2L/MIN VIA NC THEO WELL. ON TELE MONITORING SR WITH PAC:66. DENIES ANY 
C/O PAIN NOR DISCOMFORT AT THIS TIME. RIGHT HAND # 20 SL INTACT AND PATENT. BED IN LOWEST 
POSITION, LOCKED. AMBULATORY WITH STEADY GAIT. FREQUENT VISUAL CHECK DONE. CALL LIGHT WITHIN 
REACH. ABLE TO VERBALIZE NEEDS.

## 2020-10-20 VITALS — DIASTOLIC BLOOD PRESSURE: 91 MMHG | SYSTOLIC BLOOD PRESSURE: 152 MMHG

## 2020-10-20 VITALS — SYSTOLIC BLOOD PRESSURE: 134 MMHG | DIASTOLIC BLOOD PRESSURE: 81 MMHG

## 2020-10-20 VITALS — DIASTOLIC BLOOD PRESSURE: 74 MMHG | SYSTOLIC BLOOD PRESSURE: 140 MMHG

## 2020-10-20 VITALS — DIASTOLIC BLOOD PRESSURE: 94 MMHG | SYSTOLIC BLOOD PRESSURE: 125 MMHG

## 2020-10-20 RX ADMIN — ALBUTEROL SULFATE SCH MG: 2.5 SOLUTION RESPIRATORY (INHALATION) at 11:38

## 2020-10-20 RX ADMIN — Medication SCH MG: at 08:07

## 2020-10-20 RX ADMIN — ALBUTEROL SULFATE SCH MG: 2.5 SOLUTION RESPIRATORY (INHALATION) at 08:07

## 2020-10-20 RX ADMIN — PANTOPRAZOLE SODIUM SCH MG: 40 TABLET, DELAYED RELEASE ORAL at 09:15

## 2020-10-20 RX ADMIN — Medication SCH MG: at 11:38

## 2020-10-20 NOTE — NUR
TELE RN PM CLOSING NOTE



PATIENT RESTING COMFORTABLY IN BED WITH EYES CLOSED.  NO S/S OF RESPIRATORY DISTRESS.  
REMAIN ON O2 AT 2L/MIN VIA NC THEO WELL. LEFT FA # 22 SL INTACT AND PATENT. BED IN LOWEST 
POSITION, LOCKED. CALL LIGHT WITHIN REACH. ABLE TO VERBALIZE NEEDS. IN NO APPARENT DISTRESS.

## 2020-10-20 NOTE — NUR
RN NOTES

Discharge diagnosis  <Acute COPD exacerbation; slowly improving, continue steroids and, 
breathing treatment Acute repository failure likely due to above, Hyponatremia, likely due 
to SIADH - resolved Leukocytosis likely secondary, to steroid margination Acute bronchitis 
History of chronic tobacco use>

Disposition  <01 HOME, SELF-CARE>

Discharge instructions  <10/20/2020The patient was seen and assessed at bedside; request for 
him to, bed is charged home Oxygen saturations were recorded at 94% on room air Plan:I, 
notified the pulmonologist -Dr. Mukul Pemberton agrees-Case discussed,, agreed DC home 
Prednisone pack-to be tapered (Medrol Dosepak)Breo inhaler;, once a day Rescue 
inhaler-albuterol every 4 hours as needed for shortness, of breath and wheezing Levaquin 
p.o. 1 tablet daily x5 days The patient was, instructed to follow-up with the primary care 
physician within 1 week-he, verbalized understanding of care The patient was also instructed 
to refrain, secondhand smoking; he state she stopped smoking few years ago. PATIENT WILL 
DISCHARGE HOME  SELF CARE. WILL FOLLOW PRIMARY MD.

## 2020-10-20 NOTE — NUR
RN DISCHARGE NOTES

 PATIENT DISCHARGE AT HIS TIME GOING HOME SELF CARE. PATIENT STABLE, V/S WNL, REFUSED PAIN, 
NO SOB. MED RECONCILIATION AND DISCHARGE ORDER REVIEWED  AND EXPLAINED TO THE PATIENT. 
PATIENT VERBALIZED UNDERSTANDING. PATIENT WILL FOLLOW PRIMARY MD. PATIENT GET FLU 
VACCINATION BEFORE DISCHARGE. PATIENT WILL FOLLOW PCP. PRESCRIPTION HANDED TO THE PATIENT. 
PATIENT ESCORTED TO THE LOBBY FOR SAFETY. PATIENT WILL DRIVE HOME SELF.

## 2020-10-20 NOTE — NUR
spoke with kei jean regaurding bp at 0400 with sbp above 170. new order for hydralazine 
recieved. at this time bp went down  to 152/91 will cont to monitor. so no hydralizaine 
given. 

-------------------------------------------------------------------------------

Addendum: 10/20/20 at 0632 by LEXI MONTEMAYOR RN

-------------------------------------------------------------------------------

wrong patient.

## 2020-11-25 ENCOUNTER — HOSPITAL ENCOUNTER (EMERGENCY)
Dept: HOSPITAL 54 - ER | Age: 50
Discharge: HOME | End: 2020-11-25
Payer: MEDICAID

## 2020-11-25 VITALS — BODY MASS INDEX: 27.3 KG/M2 | WEIGHT: 195 LBS | HEIGHT: 71 IN

## 2020-11-25 VITALS — DIASTOLIC BLOOD PRESSURE: 100 MMHG | SYSTOLIC BLOOD PRESSURE: 148 MMHG

## 2020-11-25 DIAGNOSIS — Z79.899: ICD-10-CM

## 2020-11-25 DIAGNOSIS — J44.9: Primary | ICD-10-CM

## 2020-11-25 PROCEDURE — 99285 EMERGENCY DEPT VISIT HI MDM: CPT

## 2020-11-25 PROCEDURE — 94644 CONT INHLJ TX 1ST HOUR: CPT

## 2020-12-14 ENCOUNTER — HOSPITAL ENCOUNTER (EMERGENCY)
Dept: HOSPITAL 54 - ER | Age: 50
Discharge: HOME | End: 2020-12-14
Payer: MEDICAID

## 2020-12-14 VITALS — SYSTOLIC BLOOD PRESSURE: 129 MMHG | DIASTOLIC BLOOD PRESSURE: 76 MMHG

## 2020-12-14 VITALS — HEIGHT: 71 IN | BODY MASS INDEX: 28 KG/M2 | WEIGHT: 200 LBS

## 2020-12-14 DIAGNOSIS — Z87.891: ICD-10-CM

## 2020-12-14 DIAGNOSIS — J44.1: Primary | ICD-10-CM

## 2020-12-14 DIAGNOSIS — Z79.899: ICD-10-CM

## 2020-12-14 PROCEDURE — 99283 EMERGENCY DEPT VISIT LOW MDM: CPT

## 2020-12-14 PROCEDURE — 94640 AIRWAY INHALATION TREATMENT: CPT

## 2020-12-14 NOTE — NUR
TO ER BED 18 AMBULATORY C/O SOB WITH WHEEZING SINCE LAST NIGHT. WHEEZING HEARD 
BILATERALLY ON AUSCULTATION. PT AAOX4. PLACE PT ON CARDIAC MONITORING, 
CONTINUOUS POX. O2@2L/NC. PENDING ER MD RAE.

## 2020-12-14 NOTE — NUR
Patient discharged to home in stable condition. Written and verbal after care 
instructions given. Patient verbalizes understanding of instruction. ambulatory 
with a steady gait noted. pt aaox4 no acute distress noted, resp even and 
unlabored. pt remains pain free at this time.

## 2021-01-07 ENCOUNTER — HOSPITAL ENCOUNTER (EMERGENCY)
Dept: HOSPITAL 54 - ER | Age: 51
Discharge: HOME | End: 2021-01-07
Payer: MEDICAID

## 2021-01-07 VITALS — BODY MASS INDEX: 28 KG/M2 | WEIGHT: 200 LBS | HEIGHT: 71 IN

## 2021-01-07 VITALS — SYSTOLIC BLOOD PRESSURE: 134 MMHG | DIASTOLIC BLOOD PRESSURE: 94 MMHG

## 2021-01-07 DIAGNOSIS — Z79.899: ICD-10-CM

## 2021-01-07 DIAGNOSIS — J98.01: Primary | ICD-10-CM

## 2021-01-07 DIAGNOSIS — Z60.2: ICD-10-CM

## 2021-01-07 DIAGNOSIS — J44.9: ICD-10-CM

## 2021-01-07 DIAGNOSIS — Z87.891: ICD-10-CM

## 2021-01-07 PROCEDURE — 94640 AIRWAY INHALATION TREATMENT: CPT

## 2021-01-07 PROCEDURE — 99285 EMERGENCY DEPT VISIT HI MDM: CPT

## 2021-01-07 RX ADMIN — ALBUTEROL SULFATE ONE MG: 2.5 SOLUTION RESPIRATORY (INHALATION) at 05:07

## 2021-01-07 RX ADMIN — Medication ONE MG: at 05:07

## 2021-01-07 RX ADMIN — ALBUTEROL SULFATE ONE MG: 2.5 SOLUTION RESPIRATORY (INHALATION) at 05:41

## 2021-01-07 SDOH — SOCIAL STABILITY - SOCIAL INSECURITY: PROBLEMS RELATED TO LIVING ALONE: Z60.2

## 2021-02-14 ENCOUNTER — HOSPITAL ENCOUNTER (EMERGENCY)
Dept: HOSPITAL 54 - ER | Age: 51
Discharge: HOME | End: 2021-02-14
Payer: MEDICAID

## 2021-02-14 VITALS — SYSTOLIC BLOOD PRESSURE: 123 MMHG | DIASTOLIC BLOOD PRESSURE: 78 MMHG

## 2021-02-14 VITALS — WEIGHT: 200 LBS | HEIGHT: 71 IN | BODY MASS INDEX: 28 KG/M2

## 2021-02-14 DIAGNOSIS — J45.909: Primary | ICD-10-CM

## 2021-02-14 DIAGNOSIS — Z79.899: ICD-10-CM

## 2021-02-14 DIAGNOSIS — Z98.890: ICD-10-CM

## 2021-02-14 NOTE — NUR
PT AAOX4. AMBULATORY WITH STEADY GAIT. BIBS C/O SOB X 1 DAY. PER PT HE RAN OUT 
OF HIS INHALER. PLACED IN BED 7 ON MONITOR AND PULSE OX. MD AT BEDSIDE FOR 
EVAL. AWAITING ORDERS.

## 2021-03-16 ENCOUNTER — HOSPITAL ENCOUNTER (EMERGENCY)
Dept: HOSPITAL 54 - ER | Age: 51
Discharge: HOME | End: 2021-03-16
Payer: MEDICAID

## 2021-03-16 VITALS — BODY MASS INDEX: 28.7 KG/M2 | HEIGHT: 71 IN | WEIGHT: 205 LBS

## 2021-03-16 VITALS — SYSTOLIC BLOOD PRESSURE: 127 MMHG | DIASTOLIC BLOOD PRESSURE: 67 MMHG

## 2021-03-16 DIAGNOSIS — J44.1: Primary | ICD-10-CM

## 2021-03-16 DIAGNOSIS — Z98.890: ICD-10-CM

## 2021-03-16 DIAGNOSIS — Z79.899: ICD-10-CM

## 2021-03-16 DIAGNOSIS — Z87.891: ICD-10-CM

## 2021-03-16 LAB
BASOPHILS # BLD AUTO: 0.1 /CMM (ref 0–0.2)
BASOPHILS NFR BLD AUTO: 1.5 % (ref 0–2)
BUN SERPL-MCNC: 13 MG/DL (ref 7–18)
CALCIUM SERPL-MCNC: 9.3 MG/DL (ref 8.5–10.1)
CHLORIDE SERPL-SCNC: 103 MMOL/L (ref 98–107)
CO2 SERPL-SCNC: 30 MMOL/L (ref 21–32)
CREAT SERPL-MCNC: 1.3 MG/DL (ref 0.6–1.3)
EOSINOPHIL NFR BLD AUTO: 7.7 % (ref 0–6)
GLUCOSE SERPL-MCNC: 146 MG/DL (ref 74–106)
HCT VFR BLD AUTO: 48 % (ref 39–51)
HGB BLD-MCNC: 16.3 G/DL (ref 13.5–17.5)
LYMPHOCYTES NFR BLD AUTO: 1.3 /CMM (ref 0.8–4.8)
LYMPHOCYTES NFR BLD AUTO: 15.9 % (ref 20–44)
MCHC RBC AUTO-ENTMCNC: 34 G/DL (ref 31–36)
MCV RBC AUTO: 86 FL (ref 80–96)
MONOCYTES NFR BLD AUTO: 0.5 /CMM (ref 0.1–1.3)
MONOCYTES NFR BLD AUTO: 6.4 % (ref 2–12)
NEUTROPHILS # BLD AUTO: 5.5 /CMM (ref 1.8–8.9)
NEUTROPHILS NFR BLD AUTO: 68.5 % (ref 43–81)
NT-PROBNP SERPL-MCNC: 51 PG/ML (ref 0–125)
PLATELET # BLD AUTO: 340 /CMM (ref 150–450)
POTASSIUM SERPL-SCNC: 3.6 MMOL/L (ref 3.5–5.1)
RBC # BLD AUTO: 5.6 MIL/UL (ref 4.5–6)
SODIUM SERPL-SCNC: 142 MMOL/L (ref 136–145)
WBC NRBC COR # BLD AUTO: 8 K/UL (ref 4.3–11)

## 2021-03-16 PROCEDURE — 96375 TX/PRO/DX INJ NEW DRUG ADDON: CPT

## 2021-03-16 PROCEDURE — 83880 ASSAY OF NATRIURETIC PEPTIDE: CPT

## 2021-03-16 PROCEDURE — 99285 EMERGENCY DEPT VISIT HI MDM: CPT

## 2021-03-16 PROCEDURE — 87040 BLOOD CULTURE FOR BACTERIA: CPT

## 2021-03-16 PROCEDURE — 85025 COMPLETE CBC W/AUTO DIFF WBC: CPT

## 2021-03-16 PROCEDURE — 96366 THER/PROPH/DIAG IV INF ADDON: CPT

## 2021-03-16 PROCEDURE — 93005 ELECTROCARDIOGRAM TRACING: CPT

## 2021-03-16 PROCEDURE — 80048 BASIC METABOLIC PNL TOTAL CA: CPT

## 2021-03-16 PROCEDURE — 71045 X-RAY EXAM CHEST 1 VIEW: CPT

## 2021-03-16 PROCEDURE — 36415 COLL VENOUS BLD VENIPUNCTURE: CPT

## 2021-03-16 PROCEDURE — 94640 AIRWAY INHALATION TREATMENT: CPT

## 2021-03-16 PROCEDURE — 96365 THER/PROPH/DIAG IV INF INIT: CPT

## 2021-03-16 PROCEDURE — 84484 ASSAY OF TROPONIN QUANT: CPT

## 2021-03-16 NOTE — NUR
PT BIBSELF C/O SOB, HX COPD. PT AAOX4. AMBULATORY WITH STEADY GAIT. O2 SAT 88% 
ROOM AIR, PLACED ON 6L SIMPLE MASK AND TOLERATING WELL, O2 SAT NOW 97%. NO 
ACUTE DISTRESS NOTED AT THIS TIME.

## 2021-04-11 ENCOUNTER — HOSPITAL ENCOUNTER (EMERGENCY)
Dept: HOSPITAL 54 - ER | Age: 51
Discharge: HOME | End: 2021-04-11
Payer: MEDICAID

## 2021-04-11 VITALS — BODY MASS INDEX: 28 KG/M2 | WEIGHT: 200 LBS | HEIGHT: 71 IN

## 2021-04-11 VITALS — DIASTOLIC BLOOD PRESSURE: 91 MMHG | SYSTOLIC BLOOD PRESSURE: 133 MMHG

## 2021-04-11 DIAGNOSIS — Z79.899: ICD-10-CM

## 2021-04-11 DIAGNOSIS — Z98.890: ICD-10-CM

## 2021-04-11 DIAGNOSIS — J98.01: Primary | ICD-10-CM

## 2021-04-11 DIAGNOSIS — Z87.891: ICD-10-CM

## 2021-04-11 PROCEDURE — 96372 THER/PROPH/DIAG INJ SC/IM: CPT

## 2021-04-11 PROCEDURE — 99283 EMERGENCY DEPT VISIT LOW MDM: CPT

## 2021-04-11 PROCEDURE — 94640 AIRWAY INHALATION TREATMENT: CPT

## 2021-04-11 NOTE — NUR
pt bibself c/o sob. Pt aaox4 breathing evenly and unlabored. Pt states " my 
copd is exacerbated. Stacy been short of breath for 3 days and i think my 
hypertension came back." Pt skin warm, dry, and intact. Some swelling noted in 
bilateral lower extremities. pt attached to monitor and pox. pt given blanket 
and calllight within reach

## 2021-05-20 ENCOUNTER — HOSPITAL ENCOUNTER (INPATIENT)
Dept: HOSPITAL 54 - ER | Age: 51
LOS: 4 days | Discharge: HOME | DRG: 140 | End: 2021-05-24
Attending: NURSE PRACTITIONER | Admitting: NURSE PRACTITIONER
Payer: MEDICAID

## 2021-05-20 VITALS — SYSTOLIC BLOOD PRESSURE: 147 MMHG | DIASTOLIC BLOOD PRESSURE: 98 MMHG

## 2021-05-20 VITALS — DIASTOLIC BLOOD PRESSURE: 112 MMHG | SYSTOLIC BLOOD PRESSURE: 142 MMHG

## 2021-05-20 VITALS — HEIGHT: 71 IN | BODY MASS INDEX: 28 KG/M2 | WEIGHT: 200 LBS

## 2021-05-20 DIAGNOSIS — D72.829: ICD-10-CM

## 2021-05-20 DIAGNOSIS — J44.1: Primary | ICD-10-CM

## 2021-05-20 DIAGNOSIS — Z79.84: ICD-10-CM

## 2021-05-20 DIAGNOSIS — T38.0X5A: ICD-10-CM
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
## 2021-05-20 DIAGNOSIS — E11.9: ICD-10-CM

## 2021-05-20 DIAGNOSIS — Z20.822: ICD-10-CM

## 2021-05-20 DIAGNOSIS — F17.200: ICD-10-CM

## 2021-05-20 DIAGNOSIS — R06.03: ICD-10-CM

## 2021-05-20 LAB
BASOPHILS # BLD AUTO: 0.1 /CMM (ref 0–0.2)
BASOPHILS NFR BLD AUTO: 1.3 % (ref 0–2)
BUN SERPL-MCNC: 11 MG/DL (ref 7–18)
CALCIUM SERPL-MCNC: 9.4 MG/DL (ref 8.5–10.1)
CHLORIDE SERPL-SCNC: 103 MMOL/L (ref 98–107)
CO2 SERPL-SCNC: 26 MMOL/L (ref 21–32)
CREAT SERPL-MCNC: 1.2 MG/DL (ref 0.6–1.3)
EOSINOPHIL NFR BLD AUTO: 10.8 % (ref 0–6)
GLUCOSE SERPL-MCNC: 113 MG/DL (ref 74–106)
HCT VFR BLD AUTO: 48 % (ref 39–51)
HGB BLD-MCNC: 16.1 G/DL (ref 13.5–17.5)
LYMPHOCYTES NFR BLD AUTO: 1.7 /CMM (ref 0.8–4.8)
LYMPHOCYTES NFR BLD AUTO: 21.1 % (ref 20–44)
MCHC RBC AUTO-ENTMCNC: 34 G/DL (ref 31–36)
MCV RBC AUTO: 86 FL (ref 80–96)
MONOCYTES NFR BLD AUTO: 0.6 /CMM (ref 0.1–1.3)
MONOCYTES NFR BLD AUTO: 7 % (ref 2–12)
NEUTROPHILS # BLD AUTO: 4.8 /CMM (ref 1.8–8.9)
NEUTROPHILS NFR BLD AUTO: 59.8 % (ref 43–81)
PLATELET # BLD AUTO: 356 /CMM (ref 150–450)
POTASSIUM SERPL-SCNC: 3.9 MMOL/L (ref 3.5–5.1)
RBC # BLD AUTO: 5.55 MIL/UL (ref 4.5–6)
SODIUM SERPL-SCNC: 140 MMOL/L (ref 136–145)
WBC NRBC COR # BLD AUTO: 8 K/UL (ref 4.3–11)

## 2021-05-20 PROCEDURE — C9803 HOPD COVID-19 SPEC COLLECT: HCPCS

## 2021-05-20 PROCEDURE — G0378 HOSPITAL OBSERVATION PER HR: HCPCS

## 2021-05-20 RX ADMIN — ENOXAPARIN SODIUM SCH MG: 40 INJECTION SUBCUTANEOUS at 12:35

## 2021-05-20 RX ADMIN — ALBUTEROL SULFATE SCH MG: 2.5 SOLUTION RESPIRATORY (INHALATION) at 23:04

## 2021-05-20 RX ADMIN — MAGNESIUM SULFATE IN DEXTROSE SCH MLS/HR: 10 INJECTION, SOLUTION INTRAVENOUS at 07:50

## 2021-05-20 RX ADMIN — Medication SCH MG: at 19:03

## 2021-05-20 RX ADMIN — Medication SCH MG: at 15:30

## 2021-05-20 RX ADMIN — Medication SCH MG: at 11:41

## 2021-05-20 RX ADMIN — ALBUTEROL SULFATE SCH MG: 2.5 SOLUTION RESPIRATORY (INHALATION) at 11:41

## 2021-05-20 RX ADMIN — MAGNESIUM SULFATE IN DEXTROSE SCH MLS/HR: 10 INJECTION, SOLUTION INTRAVENOUS at 06:50

## 2021-05-20 RX ADMIN — Medication SCH MG: at 23:04

## 2021-05-20 RX ADMIN — ALBUTEROL SULFATE SCH MG: 2.5 SOLUTION RESPIRATORY (INHALATION) at 19:03

## 2021-05-20 RX ADMIN — ALBUTEROL SULFATE SCH MG: 2.5 SOLUTION RESPIRATORY (INHALATION) at 15:30

## 2021-05-20 NOTE — NUR
ms rn

received a new admission, 50 year old male, came in w/ dx of copd,awake,alert,oriented 
x4,not in any form of distress, respirations even and unlabored,no sob noted, deneis pain at 
this time, nsr on monitor, will monitor patient.

## 2021-05-20 NOTE — NUR
TELE RN NOTES



PATIENT IN BED. A/OX4. NO S/S OF DISTRESS. NO C/O OF PAIN. PATIENT ABLE TO MAKE NEEDS KNOWN. 
TELE MONITOR READING SINUS RHYTHM. L. HAND #20 g IN PLACE, SALINE LOCK. I HAVE TAKEN OUT THE 
R. AC IV FOR IT WAS BLEEDING. ASSESSED AND CLEANED. SAFETY IN PLACE: BED IN LOWEST, LOCKED 
POSITION. CALL LIGHT WITHIN REACH. WILL CONTINUE TO MONITOR.

## 2021-05-20 NOTE — NUR
Pt bibself c/o copd exacerbation. pt aaox4 breathing evenly, but deeply and 
quickly. Pt placed on 2L o2 NC 96%. Pt attached to cardiac monitor and pox. Pt 
skin is warm, dry, and intact. pt states that he ran out of his inhaler. Md at 
bedside for eval. Pt given blanket and call light. Will continue to monitor.

## 2021-05-20 NOTE — NUR
PATIENT TRANSFERRED TO ROOM 307-1 VIA ACLS PROTOCOL. NO DISTRESS NOTED. ON ROOM 
AIR WITH SPO2 OF 97%. ENDORSED TO HAO OSCAR.

## 2021-05-21 VITALS — DIASTOLIC BLOOD PRESSURE: 91 MMHG | SYSTOLIC BLOOD PRESSURE: 148 MMHG

## 2021-05-21 VITALS — SYSTOLIC BLOOD PRESSURE: 142 MMHG | DIASTOLIC BLOOD PRESSURE: 67 MMHG

## 2021-05-21 VITALS — SYSTOLIC BLOOD PRESSURE: 134 MMHG | DIASTOLIC BLOOD PRESSURE: 93 MMHG

## 2021-05-21 VITALS — DIASTOLIC BLOOD PRESSURE: 83 MMHG | SYSTOLIC BLOOD PRESSURE: 134 MMHG

## 2021-05-21 VITALS — DIASTOLIC BLOOD PRESSURE: 106 MMHG | SYSTOLIC BLOOD PRESSURE: 147 MMHG

## 2021-05-21 LAB
BASOPHILS # BLD AUTO: 0 /CMM (ref 0–0.2)
BASOPHILS NFR BLD AUTO: 0.1 % (ref 0–2)
BUN SERPL-MCNC: 17 MG/DL (ref 7–18)
CALCIUM SERPL-MCNC: 9.5 MG/DL (ref 8.5–10.1)
CHLORIDE SERPL-SCNC: 102 MMOL/L (ref 98–107)
CHOLEST SERPL-MCNC: 202 MG/DL (ref ?–200)
CO2 SERPL-SCNC: 23 MMOL/L (ref 21–32)
CREAT SERPL-MCNC: 1.1 MG/DL (ref 0.6–1.3)
EOSINOPHIL NFR BLD AUTO: 0 % (ref 0–6)
GLUCOSE SERPL-MCNC: 140 MG/DL (ref 74–106)
HCT VFR BLD AUTO: 49 % (ref 39–51)
HDLC SERPL-MCNC: 79 MG/DL (ref 40–60)
HGB BLD-MCNC: 16.5 G/DL (ref 13.5–17.5)
LDLC SERPL DIRECT ASSAY-MCNC: 107 MG/DL (ref 0–99)
LYMPHOCYTES NFR BLD AUTO: 0.8 /CMM (ref 0.8–4.8)
LYMPHOCYTES NFR BLD AUTO: 4.3 % (ref 20–44)
MAGNESIUM SERPL-MCNC: 2.4 MG/DL (ref 1.8–2.4)
MCHC RBC AUTO-ENTMCNC: 34 G/DL (ref 31–36)
MCV RBC AUTO: 86 FL (ref 80–96)
MONOCYTES NFR BLD AUTO: 0.6 /CMM (ref 0.1–1.3)
MONOCYTES NFR BLD AUTO: 3.1 % (ref 2–12)
NEUTROPHILS # BLD AUTO: 16.9 /CMM (ref 1.8–8.9)
NEUTROPHILS NFR BLD AUTO: 92.5 % (ref 43–81)
PHOSPHATE SERPL-MCNC: 4.3 MG/DL (ref 2.5–4.9)
PLATELET # BLD AUTO: 401 /CMM (ref 150–450)
POTASSIUM SERPL-SCNC: 4.3 MMOL/L (ref 3.5–5.1)
RBC # BLD AUTO: 5.77 MIL/UL (ref 4.5–6)
SODIUM SERPL-SCNC: 138 MMOL/L (ref 136–145)
TRIGL SERPL-MCNC: 58 MG/DL (ref 30–150)
TSH SERPL DL<=0.005 MIU/L-ACNC: 0.51 UIU/ML (ref 0.36–3.74)
WBC NRBC COR # BLD AUTO: 18.3 K/UL (ref 4.3–11)

## 2021-05-21 RX ADMIN — ALBUTEROL SULFATE SCH MG: 2.5 SOLUTION RESPIRATORY (INHALATION) at 12:29

## 2021-05-21 RX ADMIN — ENOXAPARIN SODIUM SCH MG: 40 INJECTION SUBCUTANEOUS at 10:03

## 2021-05-21 RX ADMIN — ALBUTEROL SULFATE SCH MG: 2.5 SOLUTION RESPIRATORY (INHALATION) at 15:10

## 2021-05-21 RX ADMIN — Medication SCH MG: at 23:18

## 2021-05-21 RX ADMIN — Medication SCH MG: at 19:52

## 2021-05-21 RX ADMIN — ALBUTEROL SULFATE SCH MG: 2.5 SOLUTION RESPIRATORY (INHALATION) at 23:18

## 2021-05-21 RX ADMIN — Medication SCH MG: at 15:10

## 2021-05-21 RX ADMIN — Medication SCH MG: at 12:29

## 2021-05-21 RX ADMIN — PANTOPRAZOLE SODIUM SCH MG: 40 TABLET, DELAYED RELEASE ORAL at 09:43

## 2021-05-21 RX ADMIN — ALBUTEROL SULFATE SCH MG: 2.5 SOLUTION RESPIRATORY (INHALATION) at 07:43

## 2021-05-21 RX ADMIN — Medication SCH MG: at 02:47

## 2021-05-21 RX ADMIN — Medication SCH MG: at 07:43

## 2021-05-21 RX ADMIN — ALBUTEROL SULFATE SCH MG: 2.5 SOLUTION RESPIRATORY (INHALATION) at 19:52

## 2021-05-21 RX ADMIN — ALBUTEROL SULFATE SCH MG: 2.5 SOLUTION RESPIRATORY (INHALATION) at 02:47

## 2021-05-21 RX ADMIN — AZITHROMYCIN DIHYDRATE SCH MG: 250 TABLET, FILM COATED ORAL at 13:41

## 2021-05-21 RX ADMIN — METFORMIN HYDROCHLORIDE SCH MG: 500 TABLET, FILM COATED ORAL at 17:10

## 2021-05-21 NOTE — NUR
RN NOTE



PATIENT AWAKE IN BED RESTING. CURRENTLY A/O X3 AND ENGLISH SPEAKING. NO COMPLAINT OF PAIN OR 
NAUSEA. CURRENTLY ON RA WITH NO SOB OR RESPIRATORY DISTRESS PRESENT. CURRENTLY ON CARDIAC 
MONITOR. PT IS SELF AMBULATORY WITH BATHROOM PRIVILEGES. SKIN IS INTACT. NO EDEMA PRESENT. 
HL PRESENT ON L HAND 22G SL. SAFETY MEASURES IN PLACE. SIDE RAILS RAISED. BED LOWERED. CALL 
LIGHT WITHIN REACH. WILL CONTINUE TO MONITOR.

## 2021-05-21 NOTE — NUR
TELE OPENING NOTES



PATIENT IN BED. A/OX4. NO S/S OF DISTRESS. NO C/O PAIN. PATIENT ABLE TO MAKE NEEDS KNOWN. 
TELE BOX READING SR IN THE 90'S. SAFETY IN PLACE: BED IN LOWEST, LOCKED POSITION. CALL LIGHT 
WITHIN REACH. WILL CONTINUE TO MONITOR.

## 2021-05-21 NOTE — NUR
RN CLOSING NOTE



PATIENT AWAKE IN BED RESTING. CURRENTLY A/O X3 AND ENGLISH SPEAKING. NO COMPLAINT OF PAIN OR 
NAUSEA. CURRENTLY ON RA WITH NO SOB OR RESPIRATORY DISTRESS PRESENT. CURRENTLY ON CARDIAC 
MONITOR. PT IS SELF AMBULATORY WITH BATHROOM PRIVILEGES. SKIN IS INTACT. NO EDEMA PRESENT. 
HL PRESENT ON L HAND 22G SL. ROUTINE MEDS GIVEN. SAFETY MEASURES IN PLACE. SIDE RAILS 
RAISED. BED LOWERED. CALL LIGHT WITHIN REACH. REPORT TO BE GIVEN TO NIGHT NURSE FOR GINO.

## 2021-05-21 NOTE — NUR
TELE RN CLOSING 



PATIENT IN BED WITH EYES CLOSED. NO S/S OF DISTRESS. NO C/O PAIN. TELE MONITOR READING SR IN 
THE 80'S. IV IN SALINE LOCK.PATIENT ABLE TO MAKE NEEDS KNOWN. NEEDS ATTENDED. SAFETY KEPT IN 
PLACE THE WHOLE SHIFT: BED IN LOWEST, LOCKED POSITION; CALL LIGHT WITHIN REACH. NO 
SIGNIFICANT CHANGE SINCE LAST SHIFT. WILL ENDORSE CARE TO MORNING SHIFT NURSE.

## 2021-05-21 NOTE — NUR
TELE RN NOTES



I HEAR WHEEZING WHEN PATIENT BREATHES. OFFERED OXYGEN BUT PATIENT DECLINED. PER PATIENT HE 
IS OKAY AND HE IS TRYING TO BREATHE ON HIS OWN AS MUCH AS POSSIBLE. O2 SATURATION WITHIN 
NORMAL LIMITS 98%.

## 2021-05-21 NOTE — NUR
RN NOTE



PT REMOVED R HAND IV LINE ACCIDENTALLY. CATHETER INTACT. NO SIGNS OF PROLONGED BLEEDING. NEW 
IV INSERTED IN L HAND 22G. WILL CONTINUE TO MONITOR.

## 2021-05-22 VITALS — SYSTOLIC BLOOD PRESSURE: 131 MMHG | DIASTOLIC BLOOD PRESSURE: 83 MMHG

## 2021-05-22 VITALS — DIASTOLIC BLOOD PRESSURE: 94 MMHG | SYSTOLIC BLOOD PRESSURE: 129 MMHG

## 2021-05-22 VITALS — DIASTOLIC BLOOD PRESSURE: 83 MMHG | SYSTOLIC BLOOD PRESSURE: 139 MMHG

## 2021-05-22 VITALS — SYSTOLIC BLOOD PRESSURE: 153 MMHG | DIASTOLIC BLOOD PRESSURE: 87 MMHG

## 2021-05-22 VITALS — SYSTOLIC BLOOD PRESSURE: 140 MMHG | DIASTOLIC BLOOD PRESSURE: 96 MMHG

## 2021-05-22 VITALS — SYSTOLIC BLOOD PRESSURE: 123 MMHG | DIASTOLIC BLOOD PRESSURE: 83 MMHG

## 2021-05-22 LAB
BASOPHILS # BLD AUTO: 0 /CMM (ref 0–0.2)
BASOPHILS NFR BLD AUTO: 0.2 % (ref 0–2)
BUN SERPL-MCNC: 26 MG/DL (ref 7–18)
CALCIUM SERPL-MCNC: 9.4 MG/DL (ref 8.5–10.1)
CHLORIDE SERPL-SCNC: 103 MMOL/L (ref 98–107)
CO2 SERPL-SCNC: 24 MMOL/L (ref 21–32)
CREAT SERPL-MCNC: 1.2 MG/DL (ref 0.6–1.3)
EOSINOPHIL NFR BLD AUTO: 0 % (ref 0–6)
GLUCOSE SERPL-MCNC: 110 MG/DL (ref 74–106)
HCT VFR BLD AUTO: 50 % (ref 39–51)
HGB BLD-MCNC: 16.8 G/DL (ref 13.5–17.5)
LYMPHOCYTES NFR BLD AUTO: 1.3 /CMM (ref 0.8–4.8)
LYMPHOCYTES NFR BLD AUTO: 6.3 % (ref 20–44)
MAGNESIUM SERPL-MCNC: 2.6 MG/DL (ref 1.8–2.4)
MCHC RBC AUTO-ENTMCNC: 34 G/DL (ref 31–36)
MCV RBC AUTO: 86 FL (ref 80–96)
MONOCYTES NFR BLD AUTO: 1.2 /CMM (ref 0.1–1.3)
MONOCYTES NFR BLD AUTO: 5.6 % (ref 2–12)
NEUTROPHILS # BLD AUTO: 18.9 /CMM (ref 1.8–8.9)
NEUTROPHILS NFR BLD AUTO: 87.9 % (ref 43–81)
PHOSPHATE SERPL-MCNC: 4.7 MG/DL (ref 2.5–4.9)
PLATELET # BLD AUTO: 442 /CMM (ref 150–450)
POTASSIUM SERPL-SCNC: 4.3 MMOL/L (ref 3.5–5.1)
RBC # BLD AUTO: 5.85 MIL/UL (ref 4.5–6)
SODIUM SERPL-SCNC: 140 MMOL/L (ref 136–145)
WBC NRBC COR # BLD AUTO: 21.5 K/UL (ref 4.3–11)

## 2021-05-22 RX ADMIN — TEMAZEPAM PRN MG: 7.5 CAPSULE ORAL at 21:49

## 2021-05-22 RX ADMIN — ALBUTEROL SULFATE SCH MG: 2.5 SOLUTION RESPIRATORY (INHALATION) at 19:31

## 2021-05-22 RX ADMIN — ENOXAPARIN SODIUM SCH MG: 40 INJECTION SUBCUTANEOUS at 09:24

## 2021-05-22 RX ADMIN — ALBUTEROL SULFATE SCH MG: 2.5 SOLUTION RESPIRATORY (INHALATION) at 07:16

## 2021-05-22 RX ADMIN — ALBUTEROL SULFATE SCH MG: 2.5 SOLUTION RESPIRATORY (INHALATION) at 11:07

## 2021-05-22 RX ADMIN — Medication SCH MG: at 03:10

## 2021-05-22 RX ADMIN — METFORMIN HYDROCHLORIDE SCH MG: 500 TABLET, FILM COATED ORAL at 08:43

## 2021-05-22 RX ADMIN — Medication SCH MG: at 19:33

## 2021-05-22 RX ADMIN — PANTOPRAZOLE SODIUM SCH MG: 40 TABLET, DELAYED RELEASE ORAL at 07:53

## 2021-05-22 RX ADMIN — ALBUTEROL SULFATE SCH MG: 2.5 SOLUTION RESPIRATORY (INHALATION) at 03:10

## 2021-05-22 RX ADMIN — METFORMIN HYDROCHLORIDE SCH MG: 500 TABLET, FILM COATED ORAL at 18:49

## 2021-05-22 RX ADMIN — Medication SCH MG: at 15:31

## 2021-05-22 RX ADMIN — AZITHROMYCIN DIHYDRATE SCH MG: 250 TABLET, FILM COATED ORAL at 12:20

## 2021-05-22 RX ADMIN — Medication SCH MG: at 11:07

## 2021-05-22 RX ADMIN — ALBUTEROL SULFATE SCH MG: 2.5 SOLUTION RESPIRATORY (INHALATION) at 15:31

## 2021-05-22 RX ADMIN — Medication SCH MG: at 07:16

## 2021-05-22 NOTE — NUR
MS/TELE/RN

PATIENT REQUESTS FOR A SLEEPING MEDICATION. OBTAINED ORDER FROM PRIYA LOEPZ NP, OF RESTORIL 
7.5 MG PO Q HS PRN. CARRIED OUT.

## 2021-05-22 NOTE — NUR
TELE RN CLOSING NOTES



PATIENT IN BED. A/OX4. MILD WHEEZING CAN BE HEARD BUT TOLERATING ROOM AIR. PATIENT DECLINING 
OXYGEN. SATURATION 100% THIS AM. PATIENT ABLE TO MAKE NEEDS KNOWN. ALL NEEDS ATTENDED. TELE 
MONITOR READING SR 80'S-90's. SAFETY KEPT IN PLACE THE WHOLE SHIFT: BED IN LOWEST, LOCKED 
POSITION. CALL LIGHT WITHIN REACH. NO SIGNIFICANT CHANGE SINCE LAST SHIFT. WILL ENDORSE CARE 
TO MORNING SHIFT NURSE.

## 2021-05-22 NOTE — NUR
MS/TELE/RN

RECEIVED PATIENT LYING IN BED, S/P BREATHING TREATMENT, PATIENT IS AWAKE, ALERT, ORIENTED, 
COMFORTABLE, NO C/O PAIN, NO DISTRESS NOTED, CALL LIGHT IN REACH, WILL MONITOR.

## 2021-05-23 VITALS — DIASTOLIC BLOOD PRESSURE: 93 MMHG | SYSTOLIC BLOOD PRESSURE: 138 MMHG

## 2021-05-23 VITALS — DIASTOLIC BLOOD PRESSURE: 80 MMHG | SYSTOLIC BLOOD PRESSURE: 112 MMHG

## 2021-05-23 VITALS — DIASTOLIC BLOOD PRESSURE: 99 MMHG | SYSTOLIC BLOOD PRESSURE: 142 MMHG

## 2021-05-23 VITALS — DIASTOLIC BLOOD PRESSURE: 83 MMHG | SYSTOLIC BLOOD PRESSURE: 129 MMHG

## 2021-05-23 VITALS — SYSTOLIC BLOOD PRESSURE: 141 MMHG | DIASTOLIC BLOOD PRESSURE: 84 MMHG

## 2021-05-23 LAB
BASOPHILS # BLD AUTO: 0 /CMM (ref 0–0.2)
BASOPHILS NFR BLD AUTO: 0.2 % (ref 0–2)
BUN SERPL-MCNC: 24 MG/DL (ref 7–18)
CALCIUM SERPL-MCNC: 9.4 MG/DL (ref 8.5–10.1)
CHLORIDE SERPL-SCNC: 101 MMOL/L (ref 98–107)
CO2 SERPL-SCNC: 24 MMOL/L (ref 21–32)
CREAT SERPL-MCNC: 1.1 MG/DL (ref 0.6–1.3)
EOSINOPHIL NFR BLD AUTO: 0 % (ref 0–6)
GLUCOSE SERPL-MCNC: 103 MG/DL (ref 74–106)
HCT VFR BLD AUTO: 51 % (ref 39–51)
HGB BLD-MCNC: 16.9 G/DL (ref 13.5–17.5)
LYMPHOCYTES NFR BLD AUTO: 1.2 /CMM (ref 0.8–4.8)
LYMPHOCYTES NFR BLD AUTO: 8.4 % (ref 20–44)
MAGNESIUM SERPL-MCNC: 2.4 MG/DL (ref 1.8–2.4)
MCHC RBC AUTO-ENTMCNC: 33 G/DL (ref 31–36)
MCV RBC AUTO: 86 FL (ref 80–96)
MONOCYTES NFR BLD AUTO: 0.8 /CMM (ref 0.1–1.3)
MONOCYTES NFR BLD AUTO: 5.8 % (ref 2–12)
NEUTROPHILS # BLD AUTO: 11.8 /CMM (ref 1.8–8.9)
NEUTROPHILS NFR BLD AUTO: 85.6 % (ref 43–81)
PHOSPHATE SERPL-MCNC: 4.8 MG/DL (ref 2.5–4.9)
PLATELET # BLD AUTO: 420 /CMM (ref 150–450)
POTASSIUM SERPL-SCNC: 4.2 MMOL/L (ref 3.5–5.1)
RBC # BLD AUTO: 5.99 MIL/UL (ref 4.5–6)
SODIUM SERPL-SCNC: 138 MMOL/L (ref 136–145)
WBC NRBC COR # BLD AUTO: 13.8 K/UL (ref 4.3–11)

## 2021-05-23 RX ADMIN — ALBUTEROL SULFATE SCH MG: 2.5 SOLUTION RESPIRATORY (INHALATION) at 00:07

## 2021-05-23 RX ADMIN — AZITHROMYCIN DIHYDRATE SCH MG: 250 TABLET, FILM COATED ORAL at 12:31

## 2021-05-23 RX ADMIN — TEMAZEPAM PRN MG: 7.5 CAPSULE ORAL at 22:36

## 2021-05-23 RX ADMIN — Medication SCH MG: at 00:07

## 2021-05-23 RX ADMIN — METFORMIN HYDROCHLORIDE SCH MG: 500 TABLET, FILM COATED ORAL at 16:43

## 2021-05-23 RX ADMIN — ALBUTEROL SULFATE SCH MG: 2.5 SOLUTION RESPIRATORY (INHALATION) at 07:35

## 2021-05-23 RX ADMIN — METFORMIN HYDROCHLORIDE SCH MG: 500 TABLET, FILM COATED ORAL at 09:00

## 2021-05-23 RX ADMIN — ALBUTEROL SULFATE SCH MG: 2.5 SOLUTION RESPIRATORY (INHALATION) at 23:29

## 2021-05-23 RX ADMIN — ENOXAPARIN SODIUM SCH MG: 40 INJECTION SUBCUTANEOUS at 09:12

## 2021-05-23 RX ADMIN — ALBUTEROL SULFATE SCH MG: 2.5 SOLUTION RESPIRATORY (INHALATION) at 11:45

## 2021-05-23 RX ADMIN — Medication SCH MG: at 15:51

## 2021-05-23 RX ADMIN — PANTOPRAZOLE SODIUM SCH MG: 40 TABLET, DELAYED RELEASE ORAL at 07:30

## 2021-05-23 RX ADMIN — Medication SCH MG: at 23:29

## 2021-05-23 RX ADMIN — Medication SCH MG: at 07:35

## 2021-05-23 RX ADMIN — Medication SCH MG: at 19:50

## 2021-05-23 RX ADMIN — Medication SCH MG: at 11:44

## 2021-05-23 RX ADMIN — Medication SCH MG: at 06:11

## 2021-05-23 RX ADMIN — ALBUTEROL SULFATE SCH MG: 2.5 SOLUTION RESPIRATORY (INHALATION) at 06:11

## 2021-05-23 RX ADMIN — ALBUTEROL SULFATE SCH MG: 2.5 SOLUTION RESPIRATORY (INHALATION) at 15:51

## 2021-05-23 RX ADMIN — ALBUTEROL SULFATE SCH MG: 2.5 SOLUTION RESPIRATORY (INHALATION) at 19:50

## 2021-05-23 NOTE — NUR
TELE/RN CLOSING NOTES

PATIENT IS ON BED, AWAKE ALERT AND ORIENTED X4. PATIENT IS ON ROOM AIR SATURATION 97%. 
PATIENT IN NO APPARENT RESPIRATORY DISTRESS NOTED. NO COMPLAINED OF PAIN NOTED AT THIS TIME. 
TELE MONITOR SINUS RHYTHM 90 BPM. IV ACCESS AT LEFT HAND #22G PATENT AND INTACT. SEEN AND 
EXAMINED BY MD WITH ORDERS MADE AND CARRIED OUT. ALL DUE MEDICATIONS WAS GIVEN. ALL NEEDS 
WAS ATTENDED. WILL CONTINUE TO MONITOR.

## 2021-05-23 NOTE — NUR
MS/TELE/RN

PATIENT IS AWAKE AT THIS TIME, COUGHING, BREATHING TREATMENT IS BEING DONE BY RT, NO SIGNS 
OF DISTRESS NOTED, GOOD SLEEP NOTED DURING THE SHIFT, ALL NEEDS ATTENDED AT THIS TIME, WILL 
CONTINUE TP MONITOR.

## 2021-05-23 NOTE — NUR
TELE/RN OPENING NOTES

RECEIVED PATIENT ON BED, AWAKE ALERT AND ORIENTED X4. PATIENT IS ON ROOM AIR. PATIENT IN NO 
APPARENT RESPIRATORY DISTRESS NOTED. NO COMPLAINED OF PAIN NOTED AT THIS TIME. WILL CONTINUE 
TO MONITOR.

## 2021-05-23 NOTE — NUR
TELE-RN OPENING NOTE



RECEIVED PATIENT RESTING IN BED. AWAKE, ALERT AND ORIENTED X 4. ABLE TO MAKE NEEDS KNOWN. NO 
COMPLAINTS OF PAIN AT THIS TIME. IV ACCESS TO LEFT HAND INTACT, PATENT AND SALINE LOCKED. 
TELE MONITOR READING SR 88. CONTINUES ON ROOM AIR WITH NO S/SX OF RESPIRATORY DISTRESS 
NOTED. CALL LIGHT WITHIN REACH. ASPIRATION, FALL AND SAFETY PRECAUTIONS MAINTAINED. WILL 
CONTINUE TO MONITOR.

## 2021-05-24 VITALS — DIASTOLIC BLOOD PRESSURE: 94 MMHG | SYSTOLIC BLOOD PRESSURE: 136 MMHG

## 2021-05-24 VITALS — SYSTOLIC BLOOD PRESSURE: 119 MMHG | DIASTOLIC BLOOD PRESSURE: 68 MMHG

## 2021-05-24 VITALS — SYSTOLIC BLOOD PRESSURE: 144 MMHG | DIASTOLIC BLOOD PRESSURE: 97 MMHG

## 2021-05-24 LAB
BASOPHILS # BLD AUTO: 0 /CMM (ref 0–0.2)
BASOPHILS NFR BLD AUTO: 0.2 % (ref 0–2)
BUN SERPL-MCNC: 25 MG/DL (ref 7–18)
CALCIUM SERPL-MCNC: 9 MG/DL (ref 8.5–10.1)
CHLORIDE SERPL-SCNC: 102 MMOL/L (ref 98–107)
CO2 SERPL-SCNC: 25 MMOL/L (ref 21–32)
CREAT SERPL-MCNC: 1 MG/DL (ref 0.6–1.3)
EOSINOPHIL NFR BLD AUTO: 1 % (ref 0–6)
GLUCOSE SERPL-MCNC: 75 MG/DL (ref 74–106)
HCT VFR BLD AUTO: 51 % (ref 39–51)
HGB BLD-MCNC: 17 G/DL (ref 13.5–17.5)
LYMPHOCYTES NFR BLD AUTO: 2.6 /CMM (ref 0.8–4.8)
LYMPHOCYTES NFR BLD AUTO: 20.2 % (ref 20–44)
MAGNESIUM SERPL-MCNC: 2.4 MG/DL (ref 1.8–2.4)
MCHC RBC AUTO-ENTMCNC: 34 G/DL (ref 31–36)
MCV RBC AUTO: 86 FL (ref 80–96)
MONOCYTES NFR BLD AUTO: 1.2 /CMM (ref 0.1–1.3)
MONOCYTES NFR BLD AUTO: 9.4 % (ref 2–12)
NEUTROPHILS # BLD AUTO: 9.1 /CMM (ref 1.8–8.9)
NEUTROPHILS NFR BLD AUTO: 69.2 % (ref 43–81)
PHOSPHATE SERPL-MCNC: 4.4 MG/DL (ref 2.5–4.9)
PLATELET # BLD AUTO: 387 /CMM (ref 150–450)
POTASSIUM SERPL-SCNC: 5.1 MMOL/L (ref 3.5–5.1)
RBC # BLD AUTO: 5.91 MIL/UL (ref 4.5–6)
SODIUM SERPL-SCNC: 138 MMOL/L (ref 136–145)
WBC NRBC COR # BLD AUTO: 13.1 K/UL (ref 4.3–11)

## 2021-05-24 RX ADMIN — Medication SCH MG: at 03:50

## 2021-05-24 RX ADMIN — METFORMIN HYDROCHLORIDE SCH MG: 500 TABLET, FILM COATED ORAL at 10:11

## 2021-05-24 RX ADMIN — Medication SCH MG: at 12:50

## 2021-05-24 RX ADMIN — AZITHROMYCIN DIHYDRATE SCH MG: 250 TABLET, FILM COATED ORAL at 13:47

## 2021-05-24 RX ADMIN — PANTOPRAZOLE SODIUM SCH MG: 40 TABLET, DELAYED RELEASE ORAL at 10:11

## 2021-05-24 RX ADMIN — ALBUTEROL SULFATE SCH MG: 2.5 SOLUTION RESPIRATORY (INHALATION) at 03:50

## 2021-05-24 RX ADMIN — ENOXAPARIN SODIUM SCH MG: 40 INJECTION SUBCUTANEOUS at 10:11

## 2021-05-24 RX ADMIN — Medication SCH MG: at 08:34

## 2021-05-24 RX ADMIN — ALBUTEROL SULFATE SCH MG: 2.5 SOLUTION RESPIRATORY (INHALATION) at 08:34

## 2021-05-24 RX ADMIN — ALBUTEROL SULFATE SCH MG: 2.5 SOLUTION RESPIRATORY (INHALATION) at 12:50

## 2021-05-24 NOTE — NUR
TELE-RN CLOSING NOTE



PATIENT CURRENTLY SLEEPING IN BED. ALERT AND ORIENTED X 4. ABLE TO MAKE NEEDS KNOWN. NO 
COMPLAINTS OF PAIN AT THIS TIME. IV ACCESS TO LEFT HAND INTACT, PATENT AND SALINE LOCKED. 
TELE MONITOR READING SR WITH BBB HR 63. CONTINUES ON ROOM AIR WITH NO S/SX OF RESPIRATORY 
DISTRESS NOTED. CALL LIGHT WITHIN REACH. ASPIRATION, FALL AND SAFETY PRECAUTIONS MAINTAINED. 
WILL ENDORSE PLAN OF CARE TO ONCOMING SHIFT.

## 2021-05-24 NOTE — NUR
TELE RN OPENING NOTE



PATIENT IS IN NO ACUTE DISTRESS, PATIENT IS IN BE RESTING. PATIENT IS ON ROOM AIR TOLERATING 
WELL, NO SOB NOTED. PATIENT IS ON TELE MONITOR READING SR. SAFETY PRECAUTIONS ARE ON. BED IS 
LOCKED IN THE LOWEST POSITION, WITH SIDE RAILS UP, CALL LIGHT WITHIN THE REACH, WILL 
CONTINUE TO MONITOR CLOSELY.

## 2021-05-24 NOTE — NUR
RN DISCHARGE NOTED



PT DISCHARGE HOME AT THIS TIME. PT MEDICALLY STABLE AND CLEARED FOR DISCHARGE BY SUREKHA GREEN. 
ALL DISCHARGE INSTRUCTIONS PROVIDED. PT VERBALIZED UNDERSTANDING. PT KEPT CLEAN AND DRY. ALL 
BELONGINGS ACCOUNTED FOR, SIGNED BY PT AND WITH PT. IV ACCESS REMOVED, PRESSURE APPLIED, 
SECURED WITH GAUZE AND TAPE, NO SIGN OF BLEEDING OR INFILTRATION NOTED. ID BAND REMOVED. PT 
LEFT UNIT IN STABLE CONDITION, TRANSPORTED BY WHEEL CHAIR TO [facility] BY NURSE. LOBO, CHARGE 
NURSE AND SUREKHA GREEN AWARE

## 2021-06-27 ENCOUNTER — HOSPITAL ENCOUNTER (EMERGENCY)
Dept: HOSPITAL 54 - ER | Age: 51
LOS: 1 days | Discharge: HOME | End: 2021-06-28
Payer: MEDICAID

## 2021-06-27 VITALS — HEIGHT: 71 IN | BODY MASS INDEX: 28 KG/M2 | WEIGHT: 200 LBS
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
## 2021-06-27 DIAGNOSIS — Z98.890: ICD-10-CM

## 2021-06-27 DIAGNOSIS — Z91.018: ICD-10-CM

## 2021-06-27 DIAGNOSIS — J44.1: ICD-10-CM
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
## 2021-06-27 DIAGNOSIS — Z87.891: ICD-10-CM

## 2021-06-27 DIAGNOSIS — Z79.899: ICD-10-CM
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
## 2021-06-27 DIAGNOSIS — S05.01XA: Primary | ICD-10-CM

## 2021-06-27 LAB
ALBUMIN SERPL BCP-MCNC: 3.2 G/DL (ref 3.4–5)
ALP SERPL-CCNC: 78 U/L (ref 46–116)
ALT SERPL W P-5'-P-CCNC: 36 U/L (ref 12–78)
AST SERPL W P-5'-P-CCNC: 21 U/L (ref 15–37)
BASOPHILS # BLD AUTO: 0 K/UL (ref 0–0.2)
BASOPHILS NFR BLD AUTO: 0.5 % (ref 0–2)
BILIRUB DIRECT SERPL-MCNC: 0.1 MG/DL (ref 0–0.2)
BILIRUB SERPL-MCNC: 0.5 MG/DL (ref 0.2–1)
BUN SERPL-MCNC: 10 MG/DL (ref 7–18)
CALCIUM SERPL-MCNC: 8.7 MG/DL (ref 8.5–10.1)
CHLORIDE SERPL-SCNC: 102 MMOL/L (ref 98–107)
CO2 SERPL-SCNC: 27 MMOL/L (ref 21–32)
CREAT SERPL-MCNC: 1 MG/DL (ref 0.6–1.3)
EOSINOPHIL NFR BLD AUTO: 5.9 % (ref 0–6)
GLUCOSE SERPL-MCNC: 170 MG/DL (ref 74–106)
HCT VFR BLD AUTO: 45 % (ref 39–51)
HGB BLD-MCNC: 14.9 G/DL (ref 13.5–17.5)
LYMPHOCYTES NFR BLD AUTO: 1.5 K/UL (ref 0.8–4.8)
LYMPHOCYTES NFR BLD AUTO: 19.3 % (ref 20–44)
MCHC RBC AUTO-ENTMCNC: 33 G/DL (ref 31–36)
MCV RBC AUTO: 87 FL (ref 80–96)
MONOCYTES NFR BLD AUTO: 0.4 K/UL (ref 0.1–1.3)
MONOCYTES NFR BLD AUTO: 5.2 % (ref 2–12)
NEUTROPHILS # BLD AUTO: 5.4 K/UL (ref 1.8–8.9)
NEUTROPHILS NFR BLD AUTO: 69.1 % (ref 43–81)
PLATELET # BLD AUTO: 357 K/UL (ref 150–450)
POTASSIUM SERPL-SCNC: 3.4 MMOL/L (ref 3.5–5.1)
PROT SERPL-MCNC: 6.9 G/DL (ref 6.4–8.2)
RBC # BLD AUTO: 5.18 MIL/UL (ref 4.5–6)
SODIUM SERPL-SCNC: 140 MMOL/L (ref 136–145)
WBC NRBC COR # BLD AUTO: 7.9 K/UL (ref 4.3–11)

## 2021-06-27 PROCEDURE — 80076 HEPATIC FUNCTION PANEL: CPT

## 2021-06-27 PROCEDURE — 71045 X-RAY EXAM CHEST 1 VIEW: CPT

## 2021-06-27 PROCEDURE — 96374 THER/PROPH/DIAG INJ IV PUSH: CPT

## 2021-06-27 PROCEDURE — 94640 AIRWAY INHALATION TREATMENT: CPT

## 2021-06-27 PROCEDURE — 80048 BASIC METABOLIC PNL TOTAL CA: CPT

## 2021-06-27 PROCEDURE — 93005 ELECTROCARDIOGRAM TRACING: CPT

## 2021-06-27 PROCEDURE — 36415 COLL VENOUS BLD VENIPUNCTURE: CPT

## 2021-06-27 PROCEDURE — 85025 COMPLETE CBC W/AUTO DIFF WBC: CPT

## 2021-06-27 PROCEDURE — 99285 EMERGENCY DEPT VISIT HI MDM: CPT

## 2021-06-27 PROCEDURE — 83880 ASSAY OF NATRIURETIC PEPTIDE: CPT

## 2021-06-27 NOTE — NUR
PATIENT MERNA FROM HOME FOR SOB X 2DAYS, HX OF COPD, BEEN USING ALBUTEROL. NO 
RELIEF. PATIENT A/OX4. ON ROOM AIR SATTING AT 94%. C/O R EYE IRRITATION. 
CONNECTED TO CARIAC AND POX MONITOR. DOCTOR AT BEDSIDE. SAFETY MEASURES 
INITIATED.

## 2021-06-28 VITALS — DIASTOLIC BLOOD PRESSURE: 87 MMHG | SYSTOLIC BLOOD PRESSURE: 142 MMHG

## 2021-06-28 NOTE — NUR
Patient discharged to home in stable condition. Written and verbal after care 
instructions given. Gave patient dc order prescriptions. Patient verbalizes 
understanding of instruction. Pt ambulatory with a steady gait.

## 2021-07-17 ENCOUNTER — HOSPITAL ENCOUNTER (INPATIENT)
Dept: HOSPITAL 54 - ER | Age: 51
LOS: 2 days | Discharge: HOME | DRG: 140 | End: 2021-07-19
Attending: FAMILY MEDICINE | Admitting: NURSE PRACTITIONER
Payer: MEDICAID

## 2021-07-17 VITALS — DIASTOLIC BLOOD PRESSURE: 85 MMHG | SYSTOLIC BLOOD PRESSURE: 135 MMHG

## 2021-07-17 VITALS — HEIGHT: 71 IN | WEIGHT: 242 LBS | BODY MASS INDEX: 33.88 KG/M2

## 2021-07-17 DIAGNOSIS — D72.829: ICD-10-CM

## 2021-07-17 DIAGNOSIS — E88.09: ICD-10-CM

## 2021-07-17 DIAGNOSIS — E44.1: ICD-10-CM

## 2021-07-17 DIAGNOSIS — R06.03: ICD-10-CM

## 2021-07-17 DIAGNOSIS — J44.1: Primary | ICD-10-CM

## 2021-07-17 DIAGNOSIS — Z20.822: ICD-10-CM

## 2021-07-17 DIAGNOSIS — Z79.51: ICD-10-CM

## 2021-07-17 DIAGNOSIS — R91.1: ICD-10-CM

## 2021-07-17 DIAGNOSIS — E87.6: ICD-10-CM
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
## 2021-07-17 DIAGNOSIS — Z79.899: ICD-10-CM

## 2021-07-17 DIAGNOSIS — Z79.84: ICD-10-CM

## 2021-07-17 DIAGNOSIS — E66.9: ICD-10-CM

## 2021-07-17 DIAGNOSIS — Z87.891: ICD-10-CM

## 2021-07-17 DIAGNOSIS — R73.9: ICD-10-CM

## 2021-07-17 DIAGNOSIS — T38.0X5A: ICD-10-CM

## 2021-07-17 LAB
ALBUMIN SERPL BCP-MCNC: 3.1 G/DL (ref 3.4–5)
ALP SERPL-CCNC: 80 U/L (ref 46–116)
ALT SERPL W P-5'-P-CCNC: 33 U/L (ref 12–78)
AST SERPL W P-5'-P-CCNC: 20 U/L (ref 15–37)
BASOPHILS # BLD AUTO: 0.1 K/UL (ref 0–0.2)
BASOPHILS NFR BLD AUTO: 1.2 % (ref 0–2)
BILIRUB DIRECT SERPL-MCNC: 0.2 MG/DL (ref 0–0.2)
BILIRUB SERPL-MCNC: 1.1 MG/DL (ref 0.2–1)
BUN SERPL-MCNC: 7 MG/DL (ref 7–18)
CALCIUM SERPL-MCNC: 8.6 MG/DL (ref 8.5–10.1)
CHLORIDE SERPL-SCNC: 103 MMOL/L (ref 98–107)
CO2 SERPL-SCNC: 26 MMOL/L (ref 21–32)
CREAT SERPL-MCNC: 1.1 MG/DL (ref 0.6–1.3)
EOSINOPHIL NFR BLD AUTO: 13.1 % (ref 0–6)
GLUCOSE SERPL-MCNC: 177 MG/DL (ref 74–106)
HCT VFR BLD AUTO: 44 % (ref 39–51)
HGB BLD-MCNC: 14.7 G/DL (ref 13.5–17.5)
LYMPHOCYTES NFR BLD AUTO: 1.1 K/UL (ref 0.8–4.8)
LYMPHOCYTES NFR BLD AUTO: 17.6 % (ref 20–44)
MCHC RBC AUTO-ENTMCNC: 33 G/DL (ref 31–36)
MCV RBC AUTO: 87 FL (ref 80–96)
MONOCYTES NFR BLD AUTO: 0.4 K/UL (ref 0.1–1.3)
MONOCYTES NFR BLD AUTO: 6 % (ref 2–12)
NEUTROPHILS # BLD AUTO: 4.1 K/UL (ref 1.8–8.9)
NEUTROPHILS NFR BLD AUTO: 62.1 % (ref 43–81)
PLATELET # BLD AUTO: 307 K/UL (ref 150–450)
POTASSIUM SERPL-SCNC: 3.1 MMOL/L (ref 3.5–5.1)
PROT SERPL-MCNC: 6.5 G/DL (ref 6.4–8.2)
RBC # BLD AUTO: 5.09 MIL/UL (ref 4.5–6)
SODIUM SERPL-SCNC: 140 MMOL/L (ref 136–145)
WBC NRBC COR # BLD AUTO: 6.5 K/UL (ref 4.3–11)

## 2021-07-17 PROCEDURE — G0378 HOSPITAL OBSERVATION PER HR: HCPCS

## 2021-07-17 PROCEDURE — C9803 HOPD COVID-19 SPEC COLLECT: HCPCS

## 2021-07-17 RX ADMIN — ENOXAPARIN SODIUM SCH MG: 40 INJECTION SUBCUTANEOUS at 21:31

## 2021-07-17 NOTE — NUR
BIBS FROM HOME TO ER BED 6. AAOX4. SOB, COUGHING BUT NOT IN  DISTRESS, O2 SAT 
96% ON RA. CAME IN FOR COPD EXACERBATION. PT NOTED WHEEZING. MD WAS AT THE 
BEDSIDE FOR EVAL. ORDERS RECEIVED, NOTED AND CARRIED OUT. IV LINE ESTABLISHED 
ON LFA 18G, BLOOD DRAWN AND SENT TO LAB. COVID SWAB DONE. RT WAS AT THE 
BEDSIDE, ON BREATHING TX. WILL CONTINUE TO MONITOR

## 2021-07-17 NOTE — NUR
pt transporrted to unit ongurney with emt at bedside on stable condition. nad 
noted during transport.

## 2021-07-18 VITALS — SYSTOLIC BLOOD PRESSURE: 142 MMHG | DIASTOLIC BLOOD PRESSURE: 88 MMHG

## 2021-07-18 VITALS — DIASTOLIC BLOOD PRESSURE: 90 MMHG | SYSTOLIC BLOOD PRESSURE: 127 MMHG

## 2021-07-18 VITALS — SYSTOLIC BLOOD PRESSURE: 135 MMHG | DIASTOLIC BLOOD PRESSURE: 88 MMHG

## 2021-07-18 LAB
BASOPHILS # BLD AUTO: 0 K/UL (ref 0–0.2)
BASOPHILS NFR BLD AUTO: 0.3 % (ref 0–2)
BUN SERPL-MCNC: 11 MG/DL (ref 7–18)
CALCIUM SERPL-MCNC: 9.2 MG/DL (ref 8.5–10.1)
CHLORIDE SERPL-SCNC: 106 MMOL/L (ref 98–107)
CHOLEST SERPL-MCNC: 235 MG/DL (ref ?–200)
CO2 SERPL-SCNC: 22 MMOL/L (ref 21–32)
CREAT SERPL-MCNC: 1 MG/DL (ref 0.6–1.3)
EOSINOPHIL NFR BLD AUTO: 0 % (ref 0–6)
GLUCOSE SERPL-MCNC: 128 MG/DL (ref 74–106)
HCT VFR BLD AUTO: 47 % (ref 39–51)
HDLC SERPL-MCNC: 99 MG/DL (ref 40–60)
HGB BLD-MCNC: 15.5 G/DL (ref 13.5–17.5)
LDLC SERPL DIRECT ASSAY-MCNC: 118 MG/DL (ref 0–99)
LYMPHOCYTES NFR BLD AUTO: 0.6 K/UL (ref 0.8–4.8)
LYMPHOCYTES NFR BLD AUTO: 4.2 % (ref 20–44)
MAGNESIUM SERPL-MCNC: 2.2 MG/DL (ref 1.8–2.4)
MCHC RBC AUTO-ENTMCNC: 33 G/DL (ref 31–36)
MCV RBC AUTO: 88 FL (ref 80–96)
MONOCYTES NFR BLD AUTO: 0.3 K/UL (ref 0.1–1.3)
MONOCYTES NFR BLD AUTO: 2.4 % (ref 2–12)
NEUTROPHILS # BLD AUTO: 13.5 K/UL (ref 1.8–8.9)
NEUTROPHILS NFR BLD AUTO: 93.1 % (ref 43–81)
PHOSPHATE SERPL-MCNC: 3.2 MG/DL (ref 2.5–4.9)
PLATELET # BLD AUTO: 338 K/UL (ref 150–450)
POTASSIUM SERPL-SCNC: 4.6 MMOL/L (ref 3.5–5.1)
RBC # BLD AUTO: 5.34 MIL/UL (ref 4.5–6)
SODIUM SERPL-SCNC: 140 MMOL/L (ref 136–145)
TRIGL SERPL-MCNC: 58 MG/DL (ref 30–150)
TSH SERPL DL<=0.005 MIU/L-ACNC: 1.02 UIU/ML (ref 0.36–3.74)
WBC NRBC COR # BLD AUTO: 14.5 K/UL (ref 4.3–11)

## 2021-07-18 RX ADMIN — ALBUTEROL SULFATE PRN MG: 2.5 SOLUTION RESPIRATORY (INHALATION) at 20:47

## 2021-07-18 RX ADMIN — ALBUTEROL SULFATE PRN MG: 2.5 SOLUTION RESPIRATORY (INHALATION) at 01:27

## 2021-07-18 RX ADMIN — ENOXAPARIN SODIUM SCH MG: 40 INJECTION SUBCUTANEOUS at 18:31

## 2021-07-18 RX ADMIN — PANTOPRAZOLE SODIUM SCH MG: 40 TABLET, DELAYED RELEASE ORAL at 10:02

## 2021-07-18 RX ADMIN — AZITHROMYCIN DIHYDRATE ONE MG: 250 TABLET, FILM COATED ORAL at 11:00

## 2021-07-18 RX ADMIN — AZITHROMYCIN DIHYDRATE ONE MG: 250 TABLET, FILM COATED ORAL at 13:55

## 2021-07-18 NOTE — NUR
Patient awake, A&Ox4. States he feels like he needs a breathing treatment RR 22, wheezing, 
RT called O2 sat 97% on RA.

## 2021-07-18 NOTE — NUR
MR IBARRA ARRIVED ON THE FLOOR 7/17 2010 FROM THE ER FROM HOME CC: SOB  DX.COPD. HE IS 
ALERT AND ORIENTATED X4 SPEECH CLEAR SATS 93% ROOM AIR. WITH TALKING HE IS SOB COUGHING 
NONPRODUCTIVE.  USES THE URINAL AT THE BEDSIDE. BED ALARM ON   CHANGING HIS OWN POSITION IN 
THE BED

## 2021-07-19 VITALS — SYSTOLIC BLOOD PRESSURE: 155 MMHG | DIASTOLIC BLOOD PRESSURE: 84 MMHG

## 2021-07-19 LAB
BASOPHILS # BLD AUTO: 0.1 K/UL (ref 0–0.2)
BASOPHILS NFR BLD AUTO: 0.5 % (ref 0–2)
BUN SERPL-MCNC: 17 MG/DL (ref 7–18)
CALCIUM SERPL-MCNC: 9.3 MG/DL (ref 8.5–10.1)
CHLORIDE SERPL-SCNC: 104 MMOL/L (ref 98–107)
CO2 SERPL-SCNC: 25 MMOL/L (ref 21–32)
CREAT SERPL-MCNC: 1 MG/DL (ref 0.6–1.3)
EOSINOPHIL NFR BLD AUTO: 0 % (ref 0–6)
GLUCOSE SERPL-MCNC: 107 MG/DL (ref 74–106)
HCT VFR BLD AUTO: 49 % (ref 39–51)
HGB BLD-MCNC: 16.1 G/DL (ref 13.5–17.5)
LYMPHOCYTES NFR BLD AUTO: 1 K/UL (ref 0.8–4.8)
LYMPHOCYTES NFR BLD AUTO: 5.3 % (ref 20–44)
MCHC RBC AUTO-ENTMCNC: 33 G/DL (ref 31–36)
MCV RBC AUTO: 88 FL (ref 80–96)
MONOCYTES NFR BLD AUTO: 0.8 K/UL (ref 0.1–1.3)
MONOCYTES NFR BLD AUTO: 4.5 % (ref 2–12)
NEUTROPHILS # BLD AUTO: 16.4 K/UL (ref 1.8–8.9)
NEUTROPHILS NFR BLD AUTO: 89.7 % (ref 43–81)
PLATELET # BLD AUTO: 367 K/UL (ref 150–450)
POTASSIUM SERPL-SCNC: 4.3 MMOL/L (ref 3.5–5.1)
RBC # BLD AUTO: 5.58 MIL/UL (ref 4.5–6)
SODIUM SERPL-SCNC: 139 MMOL/L (ref 136–145)
WBC NRBC COR # BLD AUTO: 18.3 K/UL (ref 4.3–11)

## 2021-07-19 RX ADMIN — ALBUTEROL SULFATE PRN MG: 2.5 SOLUTION RESPIRATORY (INHALATION) at 04:06

## 2021-07-19 RX ADMIN — PANTOPRAZOLE SODIUM SCH MG: 40 TABLET, DELAYED RELEASE ORAL at 08:31

## 2021-07-19 NOTE — NUR
Patient slept well throughout night though easy to wake. A&Ox4. C/o SOB and wheezing x2 
relieved by breathing treatments. O2 sats stable throughout. No other issues. Patient able 
to make needs known.

## 2021-07-19 NOTE — NUR
m/s lvn: notes



discharge instructions with e scripts given/provided and pt verbalized understanding. h/l 
removed with tip intact with no bleeding, swelling, and no redness.

## 2021-07-19 NOTE — NUR
m/s lvn: discharge



discharge home via own private vehicle in stable condition with all d'c papers and 
valuables.

## 2021-07-19 NOTE — NUR
m/s lvn: md visit



seen and examined by dr. brody with order to discharge home today with prescriptions. 
orders acknowledged.

## 2021-09-20 ENCOUNTER — HOSPITAL ENCOUNTER (EMERGENCY)
Dept: HOSPITAL 54 - ER | Age: 51
LOS: 1 days | Discharge: HOME | End: 2021-09-21
Payer: MEDICAID

## 2021-09-20 VITALS — BODY MASS INDEX: 28 KG/M2 | HEIGHT: 71 IN | WEIGHT: 200 LBS

## 2021-09-20 DIAGNOSIS — J44.1: ICD-10-CM

## 2021-09-20 DIAGNOSIS — Z87.891: ICD-10-CM

## 2021-09-20 DIAGNOSIS — Z91.011: ICD-10-CM

## 2021-09-20 DIAGNOSIS — Z20.822: ICD-10-CM

## 2021-09-20 DIAGNOSIS — Z53.29: ICD-10-CM

## 2021-09-20 DIAGNOSIS — I45.2: ICD-10-CM

## 2021-09-20 DIAGNOSIS — R06.03: Primary | ICD-10-CM

## 2021-09-20 PROCEDURE — C9803 HOPD COVID-19 SPEC COLLECT: HCPCS

## 2021-09-20 PROCEDURE — 80076 HEPATIC FUNCTION PANEL: CPT

## 2021-09-20 PROCEDURE — 85025 COMPLETE CBC W/AUTO DIFF WBC: CPT

## 2021-09-20 PROCEDURE — 71045 X-RAY EXAM CHEST 1 VIEW: CPT

## 2021-09-20 PROCEDURE — 99291 CRITICAL CARE FIRST HOUR: CPT

## 2021-09-20 PROCEDURE — 87426 SARSCOV CORONAVIRUS AG IA: CPT

## 2021-09-20 PROCEDURE — 94645 CONT INHLJ TX EACH ADDL HOUR: CPT

## 2021-09-20 PROCEDURE — 84484 ASSAY OF TROPONIN QUANT: CPT

## 2021-09-20 PROCEDURE — 93005 ELECTROCARDIOGRAM TRACING: CPT

## 2021-09-20 PROCEDURE — 94644 CONT INHLJ TX 1ST HOUR: CPT

## 2021-09-20 PROCEDURE — 80048 BASIC METABOLIC PNL TOTAL CA: CPT

## 2021-09-20 PROCEDURE — 96374 THER/PROPH/DIAG INJ IV PUSH: CPT

## 2021-09-20 PROCEDURE — 83880 ASSAY OF NATRIURETIC PEPTIDE: CPT

## 2021-09-20 PROCEDURE — 36415 COLL VENOUS BLD VENIPUNCTURE: CPT

## 2021-09-21 VITALS — SYSTOLIC BLOOD PRESSURE: 173 MMHG | DIASTOLIC BLOOD PRESSURE: 95 MMHG

## 2021-09-21 LAB
ALBUMIN SERPL BCP-MCNC: 3.9 G/DL (ref 3.4–5)
ALP SERPL-CCNC: 86 U/L (ref 46–116)
ALT SERPL W P-5'-P-CCNC: 33 U/L (ref 12–78)
AST SERPL W P-5'-P-CCNC: 26 U/L (ref 15–37)
BASOPHILS # BLD AUTO: 0.1 K/UL (ref 0–0.2)
BASOPHILS NFR BLD AUTO: 1.3 % (ref 0–2)
BILIRUB DIRECT SERPL-MCNC: 0.1 MG/DL (ref 0–0.2)
BILIRUB SERPL-MCNC: 0.5 MG/DL (ref 0.2–1)
BUN SERPL-MCNC: 14 MG/DL (ref 7–18)
CALCIUM SERPL-MCNC: 9.3 MG/DL (ref 8.5–10.1)
CHLORIDE SERPL-SCNC: 106 MMOL/L (ref 98–107)
CO2 SERPL-SCNC: 28 MMOL/L (ref 21–32)
CREAT SERPL-MCNC: 1.2 MG/DL (ref 0.6–1.3)
EOSINOPHIL NFR BLD AUTO: 4.9 % (ref 0–6)
GLUCOSE SERPL-MCNC: 89 MG/DL (ref 74–106)
HCT VFR BLD AUTO: 47 % (ref 39–51)
HGB BLD-MCNC: 16 G/DL (ref 13.5–17.5)
LYMPHOCYTES NFR BLD AUTO: 1.6 K/UL (ref 0.8–4.8)
LYMPHOCYTES NFR BLD AUTO: 17.6 % (ref 20–44)
MCHC RBC AUTO-ENTMCNC: 34 G/DL (ref 31–36)
MCV RBC AUTO: 88 FL (ref 80–96)
MONOCYTES NFR BLD AUTO: 0.6 K/UL (ref 0.1–1.3)
MONOCYTES NFR BLD AUTO: 7.3 % (ref 2–12)
NEUTROPHILS # BLD AUTO: 6.1 K/UL (ref 1.8–8.9)
NEUTROPHILS NFR BLD AUTO: 68.9 % (ref 43–81)
PLATELET # BLD AUTO: 348 K/UL (ref 150–450)
POTASSIUM SERPL-SCNC: 3.5 MMOL/L (ref 3.5–5.1)
PROT SERPL-MCNC: 7.8 G/DL (ref 6.4–8.2)
RBC # BLD AUTO: 5.4 MIL/UL (ref 4.5–6)
SODIUM SERPL-SCNC: 144 MMOL/L (ref 136–145)
WBC NRBC COR # BLD AUTO: 8.9 K/UL (ref 4.3–11)

## 2021-09-21 NOTE — NUR
Patient does not wish to proceed with medical care recommended by Dr. Gilliam. 
Patient given information related to possible complications, up to and 
including death, which could occur as a result of leaving the hospital at this 
time. Patient verbalizes understanding of risks involved due to leaving against 
medical advice. Patient has signed AMA form. DC IV.  Pt ambulatory with a 
steady gait. Rx given as ordered. pt verbalized understanding

## 2021-10-02 ENCOUNTER — HOSPITAL ENCOUNTER (INPATIENT)
Dept: HOSPITAL 54 - ER | Age: 51
LOS: 2 days | Discharge: HOME | DRG: 140 | End: 2021-10-04
Attending: INTERNAL MEDICINE | Admitting: INTERNAL MEDICINE
Payer: MEDICAID

## 2021-10-02 VITALS — DIASTOLIC BLOOD PRESSURE: 90 MMHG | SYSTOLIC BLOOD PRESSURE: 140 MMHG

## 2021-10-02 VITALS — BODY MASS INDEX: 30.8 KG/M2 | HEIGHT: 71 IN | WEIGHT: 220 LBS

## 2021-10-02 VITALS — SYSTOLIC BLOOD PRESSURE: 155 MMHG | DIASTOLIC BLOOD PRESSURE: 85 MMHG

## 2021-10-02 VITALS — DIASTOLIC BLOOD PRESSURE: 87 MMHG | SYSTOLIC BLOOD PRESSURE: 91 MMHG

## 2021-10-02 DIAGNOSIS — Z20.822: ICD-10-CM

## 2021-10-02 DIAGNOSIS — E87.2: ICD-10-CM

## 2021-10-02 DIAGNOSIS — D72.828: ICD-10-CM
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
## 2021-10-02 DIAGNOSIS — E87.6: ICD-10-CM

## 2021-10-02 DIAGNOSIS — E66.8: ICD-10-CM

## 2021-10-02 DIAGNOSIS — Z87.891: ICD-10-CM

## 2021-10-02 DIAGNOSIS — J44.1: Primary | ICD-10-CM

## 2021-10-02 DIAGNOSIS — J96.01: ICD-10-CM

## 2021-10-02 DIAGNOSIS — T38.0X5A: ICD-10-CM

## 2021-10-02 LAB
ALBUMIN SERPL BCP-MCNC: 3.2 G/DL (ref 3.4–5)
ALP SERPL-CCNC: 78 U/L (ref 46–116)
ALT SERPL W P-5'-P-CCNC: 30 U/L (ref 12–78)
AST SERPL W P-5'-P-CCNC: 13 U/L (ref 15–37)
BASOPHILS # BLD AUTO: 0.2 K/UL (ref 0–0.2)
BASOPHILS NFR BLD AUTO: 2.2 % (ref 0–2)
BILIRUB DIRECT SERPL-MCNC: 0.1 MG/DL (ref 0–0.2)
BILIRUB SERPL-MCNC: 0.5 MG/DL (ref 0.2–1)
BUN SERPL-MCNC: 18 MG/DL (ref 7–18)
CALCIUM SERPL-MCNC: 8.5 MG/DL (ref 8.5–10.1)
CHLORIDE SERPL-SCNC: 105 MMOL/L (ref 98–107)
CO2 SERPL-SCNC: 28 MMOL/L (ref 21–32)
CREAT SERPL-MCNC: 1.1 MG/DL (ref 0.6–1.3)
EOSINOPHIL NFR BLD AUTO: 7.7 % (ref 0–6)
GLUCOSE SERPL-MCNC: 141 MG/DL (ref 74–106)
HCT VFR BLD AUTO: 47 % (ref 39–51)
HGB BLD-MCNC: 15.4 G/DL (ref 13.5–17.5)
LYMPHOCYTES NFR BLD AUTO: 2.1 K/UL (ref 0.8–4.8)
LYMPHOCYTES NFR BLD AUTO: 21.9 % (ref 20–44)
MCHC RBC AUTO-ENTMCNC: 33 G/DL (ref 31–36)
MCV RBC AUTO: 88 FL (ref 80–96)
MONOCYTES NFR BLD AUTO: 0.5 K/UL (ref 0.1–1.3)
MONOCYTES NFR BLD AUTO: 5.8 % (ref 2–12)
NEUTROPHILS # BLD AUTO: 5.9 K/UL (ref 1.8–8.9)
NEUTROPHILS NFR BLD AUTO: 62.4 % (ref 43–81)
PLATELET # BLD AUTO: 374 K/UL (ref 150–450)
POTASSIUM SERPL-SCNC: 3.3 MMOL/L (ref 3.5–5.1)
PROT SERPL-MCNC: 6.8 G/DL (ref 6.4–8.2)
RBC # BLD AUTO: 5.31 MIL/UL (ref 4.5–6)
SODIUM SERPL-SCNC: 143 MMOL/L (ref 136–145)
WBC NRBC COR # BLD AUTO: 9.4 K/UL (ref 4.3–11)

## 2021-10-02 PROCEDURE — C9803 HOPD COVID-19 SPEC COLLECT: HCPCS

## 2021-10-02 PROCEDURE — A4216 STERILE WATER/SALINE, 10 ML: HCPCS

## 2021-10-02 PROCEDURE — G0378 HOSPITAL OBSERVATION PER HR: HCPCS

## 2021-10-02 RX ADMIN — BUDESONIDE SCH MG: 0.5 SUSPENSION RESPIRATORY (INHALATION) at 16:51

## 2021-10-02 RX ADMIN — ALBUTEROL SULFATE SCH MG: 2.5 SOLUTION RESPIRATORY (INHALATION) at 20:35

## 2021-10-02 RX ADMIN — ENOXAPARIN SODIUM SCH MG: 40 INJECTION SUBCUTANEOUS at 13:28

## 2021-10-02 RX ADMIN — ALBUTEROL SULFATE SCH MG: 2.5 SOLUTION RESPIRATORY (INHALATION) at 13:40

## 2021-10-02 NOTE — NUR
PT ASLEEP, EASILY AWAKEN BY VERBAL STIMULI. RR EVEN & UNLABORED. DENIES CP, 
SOB, DIZZINESS, N/V AT THIS TIME. ON TELE, NSR. WILL CONT TO MONITOR.

## 2021-10-02 NOTE — NUR
C/O SOB X 1 WEEK. PT AAOX4, DENIES CP, DIZZINESS, N/V AT THIS TIME. PLACED ON 
CARDIAC MONITOR, SR. RESP THERAPIST AT BS FOR BREATHING TX. PT THEO WELL, NAD 
NOTED AT THIS TIME. WILL CONT TO MONITOR.

## 2021-10-02 NOTE — NUR
TELE RN CLOSING NOTES



PATIENT CURRENTLY LYING IN BED, DRIFTS IN AND OUT OF SLEEP. STABLE, A/O X4. STABLE ON ROOM 
AIR - TOLERATING WELL. PATIENT STATES HE DOES NOT FEEL SHORT OF BREATH AT THE MOMENT. NO 
PAIN NOTED. IV ACCESS TO RIGHT HAND #20 - INTACT AND PATENT. PATIENT IS AMBULATORY. SAFETY 
MEASURES IN PLACE. CALL LIGHT WITHIN REACH. WILL ENDORSE TO NIGHT SHIFT NURSE FOR GINO.

## 2021-10-02 NOTE — NUR
TELE RN ADMISSION NOTE



RECEIVED PATIENT FROM ER @ 1600. STABLE, A/O X4. ON 2L OXYGEN VIA NASAL CANNULA - TOLERATING 
WELL. PATIENT STATES HE DOES NOT FEEL SHORT OF BREATH AT THE MOMENT. NO PAIN NOTED. SKIN 
INTACT. IV ACCESS TO RIGHT HAND #20 - INTACT AND PATENT. PATIENT IS AMBULATORY. REGULAR 
DIET. SAFETY MEASURES IN PLACE. CALL LIGHT WITHIN REACH. WILL CONTINUE TO MONITOR.

## 2021-10-03 VITALS — DIASTOLIC BLOOD PRESSURE: 91 MMHG | SYSTOLIC BLOOD PRESSURE: 135 MMHG

## 2021-10-03 VITALS — DIASTOLIC BLOOD PRESSURE: 88 MMHG | SYSTOLIC BLOOD PRESSURE: 145 MMHG

## 2021-10-03 VITALS — SYSTOLIC BLOOD PRESSURE: 146 MMHG | DIASTOLIC BLOOD PRESSURE: 98 MMHG

## 2021-10-03 LAB
BASOPHILS # BLD AUTO: 0.1 K/UL (ref 0–0.2)
BASOPHILS NFR BLD AUTO: 0.3 % (ref 0–2)
BUN SERPL-MCNC: 13 MG/DL (ref 7–18)
CALCIUM SERPL-MCNC: 8.5 MG/DL (ref 8.5–10.1)
CHLORIDE SERPL-SCNC: 104 MMOL/L (ref 98–107)
CO2 SERPL-SCNC: 24 MMOL/L (ref 21–32)
CREAT SERPL-MCNC: 0.8 MG/DL (ref 0.6–1.3)
EOSINOPHIL NFR BLD AUTO: 0 % (ref 0–6)
GLUCOSE SERPL-MCNC: 137 MG/DL (ref 74–106)
HCT VFR BLD AUTO: 50 % (ref 39–51)
HGB BLD-MCNC: 16.2 G/DL (ref 13.5–17.5)
LYMPHOCYTES NFR BLD AUTO: 0.7 K/UL (ref 0.8–4.8)
LYMPHOCYTES NFR BLD AUTO: 3.7 % (ref 20–44)
MAGNESIUM SERPL-MCNC: 2.4 MG/DL (ref 1.8–2.4)
MCHC RBC AUTO-ENTMCNC: 33 G/DL (ref 31–36)
MCV RBC AUTO: 88 FL (ref 80–96)
MONOCYTES NFR BLD AUTO: 0.3 K/UL (ref 0.1–1.3)
MONOCYTES NFR BLD AUTO: 1.4 % (ref 2–12)
NEUTROPHILS # BLD AUTO: 18.4 K/UL (ref 1.8–8.9)
NEUTROPHILS NFR BLD AUTO: 94.6 % (ref 43–81)
PHOSPHATE SERPL-MCNC: 2.8 MG/DL (ref 2.5–4.9)
PLATELET # BLD AUTO: 379 K/UL (ref 150–450)
POTASSIUM SERPL-SCNC: 4 MMOL/L (ref 3.5–5.1)
RBC # BLD AUTO: 5.68 MIL/UL (ref 4.5–6)
SODIUM SERPL-SCNC: 137 MMOL/L (ref 136–145)
WBC NRBC COR # BLD AUTO: 19.5 K/UL (ref 4.3–11)

## 2021-10-03 RX ADMIN — ALBUTEROL SULFATE SCH MG: 2.5 SOLUTION RESPIRATORY (INHALATION) at 14:17

## 2021-10-03 RX ADMIN — ENOXAPARIN SODIUM SCH MG: 40 INJECTION SUBCUTANEOUS at 12:34

## 2021-10-03 RX ADMIN — ALBUTEROL SULFATE SCH MG: 2.5 SOLUTION RESPIRATORY (INHALATION) at 07:35

## 2021-10-03 RX ADMIN — BUDESONIDE SCH MG: 0.5 SUSPENSION RESPIRATORY (INHALATION) at 10:04

## 2021-10-03 RX ADMIN — LEVOFLOXACIN SCH MLS/HR: 750 INJECTION, SOLUTION INTRAVENOUS at 09:26

## 2021-10-03 RX ADMIN — ALBUTEROL SULFATE SCH MG: 2.5 SOLUTION RESPIRATORY (INHALATION) at 20:27

## 2021-10-03 RX ADMIN — BUDESONIDE SCH MG: 0.5 SUSPENSION RESPIRATORY (INHALATION) at 20:27

## 2021-10-03 RX ADMIN — ALBUTEROL SULFATE SCH MG: 2.5 SOLUTION RESPIRATORY (INHALATION) at 01:37

## 2021-10-03 NOTE — NUR
RN OPENING NOTE



PT IS RESTING IN BED ASLEEP. AROUSES TO LIGHT TOUGH. ON RA WITH NO SOB OR RESPIRATORY 
DISTRESS PRESENT. A/O X4 AND ENGLISH SPEAKING. NO COMPLAINT OF PAIN OR NAUSEA. ON CARDIAC 
MONITOR. NO EDEMA PRESENT. SELF AMBULATORY WITH BATHROOM PRIVILEGES. SKIN IS INTACT. IV 
PRESENT ON R HAND 20G AND FLUSHES WELL .SALINE LOCKED. LABS AND ORDERS REVIEWED. SAFETY 
MEASURES IN PLACE. SIDE RAILS RAISED. BED LOWERED. CALL LIGHT WITHIN REACH. WILL CONTINUE TO 
MONITOR.

## 2021-10-03 NOTE — NUR
RN CLOSING NOTE



PT IS RESTING IN BED ASLEEP. AROUSES TO LIGHT TOUGH. ON RA WITH NO SOB OR RESPIRATORY 
DISTRESS PRESENT. A/O X4 AND ENGLISH SPEAKING. NO COMPLAINT OF PAIN OR NAUSEA. ON CARDIAC 
MONITOR. NO EDEMA PRESENT. SELF AMBULATORY WITH BATHROOM PRIVILEGES. SKIN IS INTACT. IV 
PRESENT ON R HAND 20G AND FLUSHES WELL .SALINE LOCKED. LABS AND ORDERS REVIEWED. SAFETY 
MEASURES IN PLACE. SIDE RAILS RAISED. BED LOWERED. CALL LIGHT WITHIN REACH. WILL GIVE REPORT 
TO NIGHT NURSE FOR GINO.

## 2021-10-03 NOTE — NUR
TELE RN OPENING NOTE 





PT A/OX4; ABLE TO MAKE NEEDS KNOWN. TOLERATING R/A WELL WITH NO SOB.  EXTERNAL CARDIAC TELE 
MONITOR READS SR AT 70'S WITH BBB. DENIES PAIN OR DISCOMFORT AT THIS TIME. R HAND#20G S/L; 
PATENT AND INTACT. SAFETY MEASURES IN PLACE: BED IN LOWEST LOCKED POSITION, SIDE RAILS UP 
X2, CALL LIGHT WITHIN EASY REACH. PT IN STABLE CONDITION, WILL CONT. PLAN OF CARE.

## 2021-10-03 NOTE — NUR
TELE RN NOTE - INSOMNIA



PT C/O DIFFICULTY FALLING ASLEEP. ADMINISTERED AMBIEN PER PTS REQUEST AND AND ORDERED. WILL 
REASSESS FOR SLEEP IN 30 MINUTES.

## 2021-10-04 VITALS — SYSTOLIC BLOOD PRESSURE: 145 MMHG | DIASTOLIC BLOOD PRESSURE: 90 MMHG

## 2021-10-04 VITALS — SYSTOLIC BLOOD PRESSURE: 132 MMHG | DIASTOLIC BLOOD PRESSURE: 92 MMHG

## 2021-10-04 VITALS — DIASTOLIC BLOOD PRESSURE: 78 MMHG | SYSTOLIC BLOOD PRESSURE: 155 MMHG

## 2021-10-04 LAB
BASOPHILS # BLD AUTO: 0 K/UL (ref 0–0.2)
BASOPHILS NFR BLD AUTO: 0.1 % (ref 0–2)
BUN SERPL-MCNC: 20 MG/DL (ref 7–18)
CALCIUM SERPL-MCNC: 8.9 MG/DL (ref 8.5–10.1)
CHLORIDE SERPL-SCNC: 103 MMOL/L (ref 98–107)
CO2 SERPL-SCNC: 27 MMOL/L (ref 21–32)
CREAT SERPL-MCNC: 0.9 MG/DL (ref 0.6–1.3)
EOSINOPHIL NFR BLD AUTO: 0 % (ref 0–6)
GLUCOSE SERPL-MCNC: 137 MG/DL (ref 74–106)
HCT VFR BLD AUTO: 51 % (ref 39–51)
HGB BLD-MCNC: 16.7 G/DL (ref 13.5–17.5)
LYMPHOCYTES NFR BLD AUTO: 0.7 K/UL (ref 0.8–4.8)
LYMPHOCYTES NFR BLD AUTO: 2.7 % (ref 20–44)
MCHC RBC AUTO-ENTMCNC: 33 G/DL (ref 31–36)
MCV RBC AUTO: 88 FL (ref 80–96)
MONOCYTES NFR BLD AUTO: 0.5 K/UL (ref 0.1–1.3)
MONOCYTES NFR BLD AUTO: 1.8 % (ref 2–12)
NEUTROPHILS # BLD AUTO: 25.5 K/UL (ref 1.8–8.9)
NEUTROPHILS NFR BLD AUTO: 95.4 % (ref 43–81)
PLATELET # BLD AUTO: 403 K/UL (ref 150–450)
POTASSIUM SERPL-SCNC: 4.2 MMOL/L (ref 3.5–5.1)
RBC # BLD AUTO: 5.77 MIL/UL (ref 4.5–6)
SODIUM SERPL-SCNC: 138 MMOL/L (ref 136–145)
WBC NRBC COR # BLD AUTO: 26.8 K/UL (ref 4.3–11)

## 2021-10-04 RX ADMIN — ALBUTEROL SULFATE SCH MG: 2.5 SOLUTION RESPIRATORY (INHALATION) at 08:22

## 2021-10-04 RX ADMIN — ALBUTEROL SULFATE SCH MG: 2.5 SOLUTION RESPIRATORY (INHALATION) at 01:14

## 2021-10-04 RX ADMIN — BUDESONIDE SCH MG: 0.5 SUSPENSION RESPIRATORY (INHALATION) at 08:23

## 2021-10-04 RX ADMIN — LEVOFLOXACIN SCH MLS/HR: 750 INJECTION, SOLUTION INTRAVENOUS at 09:00

## 2021-10-04 NOTE — NUR
TELE RN CLOSING NOTE 



PT A/OX4; ABLE TO MAKE NEEDS KNOWN. TOLERATING R/A WELL WITH NO SOB.  EXTERNAL CARDIAC TELE 
MONITOR READS SR AT 70'S WITH BBB. DENIES PAIN OR DISCOMFORT AT THIS TIME. R HAND#20G S/L; 
PATENT AND INTACT. SAFETY MEASURES IN PLACE: BED IN LOWEST LOCKED POSITION, SIDE RAILS UP 
X2, CALL LIGHT WITHIN EASY REACH. PT IN STABLE CONDITION, ENDORSED PLAN OF CARE TO MORNING 
RN.

## 2021-10-04 NOTE — NUR
TELE RN OPENING NOTE 





RECEIVED PATIENT RESTING IN BED PT A/OX4; ABLE TO MAKE NEEDS KNOWN. PATIENT IS BREATHING 
EVENLY AND NONLABORED TOLERATING R/A WELL WITH NO SOB.  EXTERNAL CARDIAC TELE MONITOR READS 
SR AT 70'S WITH BBB. DENIES PAIN OR DISCOMFORT AT THIS TIME. R HAND#20G S/L; PATENT AND 
INTACT. SAFETY MEASURES IN PLACE: BED IN LOWEST LOCKED POSITION, SIDE RAILS UP X2, CALL 
LIGHT WITHIN EASY REACH. WILL CONTINUE TO MONITOR

## 2021-10-04 NOTE — NUR
RN DISCHARGE NOTE



RECEIVED ORDER FOR DISCHARGE. PATIENT IS A/O X4 ABLE TO MAKE NEEDS KNOWN, PATIENT IS 
BREATHING EVENLY AND NONLABORED ON ROOM AIR. PATIENT DOES NOT COMPLAIN OF ANY DISTRESS OR 
PAIN. PATIENT WAS GIVEN DISCHARGE INSTRUCTIONS BOTH VERBALLY AND IN WRITTEN FORM. PATIENT 
VERBALIZED UNDERSTANDING. IV ACCESS REMOVED CATHETER TIP INTACT, PRESSURE DRESSING APPLIED 
NO BLEEDING NOTED. BELONGINGS ACCOUNTED FOR AND FORM SIGNED. PATIENT LEFT IN STABLE 
CONDITION VIA PRIVATE CAR.

## 2021-11-01 ENCOUNTER — HOSPITAL ENCOUNTER (EMERGENCY)
Dept: HOSPITAL 54 - ER | Age: 51
LOS: 1 days | Discharge: HOME | End: 2021-11-02
Payer: MEDICAID

## 2021-11-01 VITALS — HEIGHT: 71 IN | WEIGHT: 200 LBS | BODY MASS INDEX: 28 KG/M2

## 2021-11-01 DIAGNOSIS — J44.1: Primary | ICD-10-CM

## 2021-11-01 DIAGNOSIS — Z87.891: ICD-10-CM

## 2021-11-01 DIAGNOSIS — Z91.048: ICD-10-CM

## 2021-11-01 DIAGNOSIS — Z79.899: ICD-10-CM

## 2021-11-01 DIAGNOSIS — Z98.890: ICD-10-CM

## 2021-11-01 PROCEDURE — 94644 CONT INHLJ TX 1ST HOUR: CPT

## 2021-11-01 PROCEDURE — 71045 X-RAY EXAM CHEST 1 VIEW: CPT

## 2021-11-01 PROCEDURE — 99285 EMERGENCY DEPT VISIT HI MDM: CPT

## 2021-11-01 NOTE — NUR
BIBS C/O SOB X3DAYS ALSO NEEDS MEDREFILL +WHEEZING. PT A/OX4. TOLERATING R/A AT 
94%. 

PUT ON O2 1LPM VIA N/C; TOLERATING AT 97%

## 2021-11-02 VITALS — DIASTOLIC BLOOD PRESSURE: 87 MMHG | SYSTOLIC BLOOD PRESSURE: 152 MMHG

## 2021-11-22 ENCOUNTER — HOSPITAL ENCOUNTER (EMERGENCY)
Dept: HOSPITAL 54 - ER | Age: 51
LOS: 1 days | Discharge: HOME | End: 2021-11-23
Payer: MEDICAID

## 2021-11-22 VITALS — HEIGHT: 71 IN | WEIGHT: 200 LBS | BODY MASS INDEX: 28 KG/M2

## 2021-11-22 DIAGNOSIS — R06.03: Primary | ICD-10-CM

## 2021-11-22 DIAGNOSIS — J44.1: ICD-10-CM

## 2021-11-22 DIAGNOSIS — Z79.899: ICD-10-CM

## 2021-11-22 DIAGNOSIS — Z87.891: ICD-10-CM

## 2021-11-22 DIAGNOSIS — Z88.8: ICD-10-CM

## 2021-11-22 DIAGNOSIS — Z60.2: ICD-10-CM

## 2021-11-22 DIAGNOSIS — Z98.890: ICD-10-CM

## 2021-11-22 PROCEDURE — 94640 AIRWAY INHALATION TREATMENT: CPT

## 2021-11-22 PROCEDURE — 96372 THER/PROPH/DIAG INJ SC/IM: CPT

## 2021-11-22 PROCEDURE — 94644 CONT INHLJ TX 1ST HOUR: CPT

## 2021-11-22 PROCEDURE — 99291 CRITICAL CARE FIRST HOUR: CPT

## 2021-11-22 SDOH — SOCIAL STABILITY - SOCIAL INSECURITY: PROBLEMS RELATED TO LIVING ALONE: Z60.2

## 2021-11-23 VITALS — SYSTOLIC BLOOD PRESSURE: 146 MMHG | DIASTOLIC BLOOD PRESSURE: 84 MMHG

## 2021-11-23 RX ADMIN — Medication ONE MG: at 03:16

## 2021-11-23 RX ADMIN — ALBUTEROL SULFATE ONE MG: 2.5 SOLUTION RESPIRATORY (INHALATION) at 03:16

## 2021-11-23 RX ADMIN — Medication ONE MG: at 01:17

## 2021-11-23 RX ADMIN — ALBUTEROL SULFATE ONE MG: 2.5 SOLUTION RESPIRATORY (INHALATION) at 01:17

## 2021-11-23 NOTE — NUR
BREATHING TREATMENT COMPLETED. PT REPORTS IMPROVEMENT IN BREATHING BREATHING 
EVEN AND UNLABORED. 98% RA.

## 2021-11-23 NOTE — NUR
PT REPORTS AN IMPROVEMENT IN BREATHING. BREATHING IS EVEN AND UNLABORED SATTING 
99% RA BILATERAL IMPROVEMENT IN WHEEZES. PT DISCHARGED HOME IN STABLE 
CONDITION. WRITTEN AND VERBAL AFTERCARE INSTRUCTIONS PROVIDED AND PATIENT 
REPORTS UNDERSTANDING OF INSTRUCTIONS. ALL VITALS STABLE AT TIME OF DISCHARGE.

## 2021-11-23 NOTE — NUR
BIB SELF C/O SOB X2DAYS DUE TO HX COPD. HAS BEEN USING PRESCRIBED INHALER WITH 
MINIMAL RELIEF, HOWEVER HE FINISHED THE INHALER TODAY. PT SATTING 94% WITH MILD 
LABORED BREATHING WHEEZES NOTED TO LUNGS BILATERALLY. PLACED ON MONITOR MD WAS 
AT THE BEDSIDE FOR EVAL.

## 2021-12-10 ENCOUNTER — HOSPITAL ENCOUNTER (INPATIENT)
Dept: HOSPITAL 54 - ER | Age: 51
LOS: 4 days | Discharge: HOME | DRG: 140 | End: 2021-12-14
Attending: NURSE PRACTITIONER | Admitting: NURSE PRACTITIONER
Payer: MEDICAID

## 2021-12-10 VITALS — HEIGHT: 71 IN | BODY MASS INDEX: 28.56 KG/M2 | WEIGHT: 204 LBS

## 2021-12-10 DIAGNOSIS — E87.6: ICD-10-CM

## 2021-12-10 DIAGNOSIS — D72.829: ICD-10-CM

## 2021-12-10 DIAGNOSIS — Z83.3: ICD-10-CM

## 2021-12-10 DIAGNOSIS — D75.1: ICD-10-CM

## 2021-12-10 DIAGNOSIS — J44.1: Primary | ICD-10-CM

## 2021-12-10 DIAGNOSIS — Z79.51: ICD-10-CM

## 2021-12-10 DIAGNOSIS — Z87.891: ICD-10-CM

## 2021-12-10 DIAGNOSIS — Z20.822: ICD-10-CM

## 2021-12-10 DIAGNOSIS — J96.00: ICD-10-CM

## 2021-12-10 LAB
BASOPHILS # BLD AUTO: 0.2 K/UL (ref 0–0.2)
BASOPHILS NFR BLD AUTO: 2 % (ref 0–2)
BUN SERPL-MCNC: 15 MG/DL (ref 7–18)
CALCIUM SERPL-MCNC: 8.7 MG/DL (ref 8.5–10.1)
CHLORIDE SERPL-SCNC: 105 MMOL/L (ref 98–107)
CO2 SERPL-SCNC: 26 MMOL/L (ref 21–32)
CREAT SERPL-MCNC: 1 MG/DL (ref 0.6–1.3)
EOSINOPHIL NFR BLD AUTO: 12.4 % (ref 0–6)
GLUCOSE SERPL-MCNC: 106 MG/DL (ref 74–106)
HCT VFR BLD AUTO: 49 % (ref 39–51)
HGB BLD-MCNC: 16.2 G/DL (ref 13.5–17.5)
LYMPHOCYTES NFR BLD AUTO: 1.4 K/UL (ref 0.8–4.8)
LYMPHOCYTES NFR BLD AUTO: 17.6 % (ref 20–44)
MCHC RBC AUTO-ENTMCNC: 33 G/DL (ref 31–36)
MCV RBC AUTO: 87 FL (ref 80–96)
MONOCYTES NFR BLD AUTO: 0.5 K/UL (ref 0.1–1.3)
MONOCYTES NFR BLD AUTO: 7 % (ref 2–12)
NEUTROPHILS # BLD AUTO: 4.7 K/UL (ref 1.8–8.9)
NEUTROPHILS NFR BLD AUTO: 61 % (ref 43–81)
PLATELET # BLD AUTO: 289 K/UL (ref 150–450)
POTASSIUM SERPL-SCNC: 3.4 MMOL/L (ref 3.5–5.1)
RBC # BLD AUTO: 5.59 MIL/UL (ref 4.5–6)
SODIUM SERPL-SCNC: 139 MMOL/L (ref 136–145)
WBC NRBC COR # BLD AUTO: 7.7 K/UL (ref 4.3–11)

## 2021-12-10 PROCEDURE — A4216 STERILE WATER/SALINE, 10 ML: HCPCS

## 2021-12-10 PROCEDURE — G0378 HOSPITAL OBSERVATION PER HR: HCPCS

## 2021-12-10 PROCEDURE — U0003 INFECTIOUS AGENT DETECTION BY NUCLEIC ACID (DNA OR RNA); SEVERE ACUTE RESPIRATORY SYNDROME CORONAVIRUS 2 (SARS-COV-2) (CORONAVIRUS DISEASE [COVID-19]), AMPLIFIED PROBE TECHNIQUE, MAKING USE OF HIGH THROUGHPUT TECHNOLOGIES AS DESCRIBED BY CMS-2020-01-R: HCPCS

## 2021-12-10 PROCEDURE — C9803 HOPD COVID-19 SPEC COLLECT: HCPCS

## 2021-12-10 NOTE — NUR
TO ER BED 6, C/O WORSENING SOB X "FEW" DAYS, AAOX3, BREATHING EVEN AND NON 
LABORED, HX OF COPD AND DEVIATED SEPTUM, CONNECTED TO MONITOR

## 2021-12-11 VITALS — SYSTOLIC BLOOD PRESSURE: 138 MMHG | DIASTOLIC BLOOD PRESSURE: 93 MMHG

## 2021-12-11 VITALS — SYSTOLIC BLOOD PRESSURE: 149 MMHG | DIASTOLIC BLOOD PRESSURE: 84 MMHG

## 2021-12-11 VITALS — DIASTOLIC BLOOD PRESSURE: 94 MMHG | SYSTOLIC BLOOD PRESSURE: 171 MMHG

## 2021-12-11 VITALS — DIASTOLIC BLOOD PRESSURE: 87 MMHG | SYSTOLIC BLOOD PRESSURE: 135 MMHG

## 2021-12-11 VITALS — DIASTOLIC BLOOD PRESSURE: 90 MMHG | SYSTOLIC BLOOD PRESSURE: 153 MMHG

## 2021-12-11 LAB
BASOPHILS # BLD AUTO: 0 K/UL (ref 0–0.2)
BASOPHILS NFR BLD AUTO: 0.2 % (ref 0–2)
BUN SERPL-MCNC: 13 MG/DL (ref 7–18)
CALCIUM SERPL-MCNC: 9 MG/DL (ref 8.5–10.1)
CHLORIDE SERPL-SCNC: 104 MMOL/L (ref 98–107)
CO2 SERPL-SCNC: 27 MMOL/L (ref 21–32)
CREAT SERPL-MCNC: 0.9 MG/DL (ref 0.6–1.3)
EOSINOPHIL NFR BLD AUTO: 0.1 % (ref 0–6)
GLUCOSE SERPL-MCNC: 120 MG/DL (ref 74–106)
HCT VFR BLD AUTO: 46 % (ref 39–51)
HGB BLD-MCNC: 15.5 G/DL (ref 13.5–17.5)
LYMPHOCYTES NFR BLD AUTO: 0.6 K/UL (ref 0.8–4.8)
LYMPHOCYTES NFR BLD AUTO: 5.9 % (ref 20–44)
MAGNESIUM SERPL-MCNC: 2 MG/DL (ref 1.8–2.4)
MCHC RBC AUTO-ENTMCNC: 34 G/DL (ref 31–36)
MCV RBC AUTO: 87 FL (ref 80–96)
MONOCYTES NFR BLD AUTO: 0.3 K/UL (ref 0.1–1.3)
MONOCYTES NFR BLD AUTO: 3 % (ref 2–12)
NEUTROPHILS # BLD AUTO: 9.9 K/UL (ref 1.8–8.9)
NEUTROPHILS NFR BLD AUTO: 90.8 % (ref 43–81)
PHOSPHATE SERPL-MCNC: 2.4 MG/DL (ref 2.5–4.9)
PLATELET # BLD AUTO: 318 K/UL (ref 150–450)
POTASSIUM SERPL-SCNC: 4 MMOL/L (ref 3.5–5.1)
RBC # BLD AUTO: 5.27 MIL/UL (ref 4.5–6)
SODIUM SERPL-SCNC: 139 MMOL/L (ref 136–145)
WBC NRBC COR # BLD AUTO: 10.9 K/UL (ref 4.3–11)

## 2021-12-11 RX ADMIN — BUDESONIDE SCH MG: 0.5 SUSPENSION RESPIRATORY (INHALATION) at 15:00

## 2021-12-11 RX ADMIN — ALBUTEROL SULFATE SCH MG: 2.5 SOLUTION RESPIRATORY (INHALATION) at 01:30

## 2021-12-11 RX ADMIN — ALBUTEROL SULFATE SCH MG: 2.5 SOLUTION RESPIRATORY (INHALATION) at 07:35

## 2021-12-11 RX ADMIN — LEVOFLOXACIN SCH MLS/HR: 500 INJECTION, SOLUTION INTRAVENOUS at 00:53

## 2021-12-11 RX ADMIN — BUDESONIDE SCH MG: 0.5 SUSPENSION RESPIRATORY (INHALATION) at 08:33

## 2021-12-11 RX ADMIN — ALBUTEROL SULFATE SCH MG: 2.5 SOLUTION RESPIRATORY (INHALATION) at 19:15

## 2021-12-11 RX ADMIN — ALBUTEROL SULFATE SCH MG: 2.5 SOLUTION RESPIRATORY (INHALATION) at 14:06

## 2021-12-11 NOTE — NUR
RN admission notes

Received Pt from CELESTINA Gold. Pt arrived in our unit with a gurney. Pt is alert and 
orientedX4. Pt is able to ambulates with a steady gait. Respiration is in 2 L NC with 
saturation 95%. No SOB. No S/S of distress noted. IV site at R wrist# 20 is clean, intact 
and flushes well. Pt denies any allergy. Pt stated " i don't have any allergy." Skin 
assessment is done and performed. Pt's skin is intact. Pt's belonging is checked by RANDOLPH Guajardo. Reorient Pt to the room and call light. Pt verbalized understanding. Received admission 
orders from Miugelina Quinn NP. Safety precautions is maintained. Bed at low position, brakes 
locked, side railsupX2, urinal at the bedside and call light is within reach. Will continue 
to monitor.

## 2021-12-11 NOTE — NUR
RN notes

Rechecked BP at 0135 am. /93. pulse 60. Pt is siting in bed comfortably watching TV. 
No SOB. No S/S of distress noted. Snacks is given and provided. Will continue to monitor.

## 2021-12-11 NOTE — NUR
RN OPENING notes

Received pt resting in bed comfortably. Pt is alert and orientedX4. Respiration on 2 L NC. 
No SOB. No S/s of distress noted at this time. IV site at R wrist # 20 is clean, intact and 
flushing well. Safety precautions in place Bed at low position and locked, side rails upX2 
and call light is within reach. will continue to monitor.

## 2021-12-11 NOTE — NUR
RN ms closing notes

Pt is resting in bed comfortably. Pt is alert and orientedX4. Respiration on 2 L NC. No SOB. 
No S/s of distress noted. Vs is stable. IV site at R wrist # 20 is clean, intact and SL. 
Routine meds were given as ordered. Kept Pt clean, dry and comfortable. All needs met and 
attended. Safety precautions is maintained. Bed at low position, brakes locked, side rails 
upX2 and call light is within reach. will endorse to am nurse for GINO.

## 2021-12-11 NOTE — NUR
RN CLOSING notes

PATIENT IS resting in bed comfortably. Pt is alert and orientedX4. Respiration on 2 L NC 95% 
No SOB. No S/s of distress noted at this time. IV site at R wrist # 20 is clean, intact and 
flushing well. Safety precautions in place Bed at low position and locked, side rails upX2 
and call light is within reach. will endorse to night shift RN

## 2021-12-11 NOTE — NUR
RN notes

Informed and notified MD regarding Pt's potassium 3.4. MD ordered kdur 20 meq/po X1. Order 
carried out.

## 2021-12-11 NOTE — NUR
RN notes

Pt is requesting inhaler. Informed and notified MD. MD ordered abuterol inhaler/ 2 puffs/Q 
4hr/prn. Charge nurse is aware and informed. Order carried out.

## 2021-12-11 NOTE — NUR
MS RN OPENING NOTES: RECEIVED PATIENT IN BED, AWAKE, A/O X4. NO S/S OF DISTRESS NOTED. NO 
COMPLAIN OF PAIN. CALL LIGHT WITHIN REACH. BED IN LOWEST AND LOCKED POSITION. HOB ELEVATED. 
ON O2 AT 2L/MIN NASAL CANNULA.

## 2021-12-12 VITALS — DIASTOLIC BLOOD PRESSURE: 80 MMHG | SYSTOLIC BLOOD PRESSURE: 151 MMHG

## 2021-12-12 VITALS — DIASTOLIC BLOOD PRESSURE: 85 MMHG | SYSTOLIC BLOOD PRESSURE: 140 MMHG

## 2021-12-12 VITALS — DIASTOLIC BLOOD PRESSURE: 77 MMHG | SYSTOLIC BLOOD PRESSURE: 135 MMHG

## 2021-12-12 LAB
BUN SERPL-MCNC: 17 MG/DL (ref 7–18)
CALCIUM SERPL-MCNC: 8.7 MG/DL (ref 8.5–10.1)
CHLORIDE SERPL-SCNC: 102 MMOL/L (ref 98–107)
CO2 SERPL-SCNC: 27 MMOL/L (ref 21–32)
CREAT SERPL-MCNC: 1.1 MG/DL (ref 0.6–1.3)
GLUCOSE SERPL-MCNC: 127 MG/DL (ref 74–106)
PHOSPHATE SERPL-MCNC: 4.4 MG/DL (ref 2.5–4.9)
POTASSIUM SERPL-SCNC: 4.4 MMOL/L (ref 3.5–5.1)
SODIUM SERPL-SCNC: 138 MMOL/L (ref 136–145)

## 2021-12-12 RX ADMIN — BUDESONIDE SCH MG: 0.5 SUSPENSION RESPIRATORY (INHALATION) at 15:00

## 2021-12-12 RX ADMIN — TEMAZEPAM PRN MG: 15 CAPSULE ORAL at 01:00

## 2021-12-12 RX ADMIN — BUDESONIDE SCH MG: 0.5 SUSPENSION RESPIRATORY (INHALATION) at 07:05

## 2021-12-12 RX ADMIN — LEVOFLOXACIN SCH MLS/HR: 500 INJECTION, SOLUTION INTRAVENOUS at 00:58

## 2021-12-12 RX ADMIN — TEMAZEPAM PRN MG: 15 CAPSULE ORAL at 23:06

## 2021-12-12 RX ADMIN — ALBUTEROL SULFATE SCH GM: 90 AEROSOL, METERED RESPIRATORY (INHALATION) at 21:02

## 2021-12-12 RX ADMIN — ALBUTEROL SULFATE SCH MG: 2.5 SOLUTION RESPIRATORY (INHALATION) at 07:35

## 2021-12-12 RX ADMIN — LEVOFLOXACIN SCH MLS/HR: 500 INJECTION, SOLUTION INTRAVENOUS at 23:06

## 2021-12-12 RX ADMIN — ALBUTEROL SULFATE SCH GM: 90 AEROSOL, METERED RESPIRATORY (INHALATION) at 12:32

## 2021-12-12 RX ADMIN — ALBUTEROL SULFATE SCH MG: 2.5 SOLUTION RESPIRATORY (INHALATION) at 00:39

## 2021-12-12 NOTE — NUR
RN OPENING NOTES



RECEIVED PATIENT ON BED, SLEEPING A/O X 4 RESPONDED TO NAME, RESPIRATORY EVEN AND UNLABORED, 
ON RA 20G WITH NO SOB NOTED, DENIES PAIN, NO S/S OF DISTRESS NOTE. SAFETY PROTOCOL IN PLACE 
BED IN LOWEST POSITION, LOCKED, SIDE RAILS UP, BED ALARM ARMED. CALL LIGHT WITH IN REACH

## 2021-12-12 NOTE — NUR
RN NOTE 



DR RHOADES AT BEDSIDE, INFORMED THAT PATIENT EXPRESSED WANTING TO LEAVE THIS MORNING. PER 
 PATIENTS PCR IS STILL PENDING AND IS ON 2 L NC. PATIENT UNDERSTOOD TREATMENT PLAN.

## 2021-12-12 NOTE — NUR
RN CLOSING NOTE 



PATIENT REMAINS IN ROOM IN NO SIGNS OF RESPIRATORY DISTRESS OR PAIN, PATIENT  ON 2L OF 02 
VIA NC ;TOLERATING WELL SATURATING @ >95% SP02. SAFETY MEASURES IMPLEMENTED, BED IN LOWEST 
POSITION, LOCKED, SIDE RAILS UP, CALL LIGHT WITHIN REACH. ALL NEEDS AND ORDERS ADDRESSED 
DURING THE SHIFT. IV ACCESS MAINTAINED INTACT, SECURED AND FLUSHING WELL.  ALL DUE MEDS 
GIVEN AS ORDERED & SCHEDULED; PATIENT TOLERATED WELL. PATIENT KEPT CLEAN AND COMFORTABLE 
WITHIN THE SHIFT. PATIENT ENDORSED TO NIGHT SHIFT FOR GINO.

## 2021-12-12 NOTE — NUR
RN OPENING NOTES:



RECEIVED PATIENT IN BED AWAKE, ALERT, ORIENTED X4 AND VERBALLY RESPONSIVE. ON O2 2L/MIN VIA 
N/C. NO SOB. BREATHING EVEN AND UNLABORED. NO ON AND OFF NON-PRODUCTIVE COUGH. IV ACCESS ON 
RT WRIST INTACT AND PATENT. NO S/S OF INFILTRATIONS. NO C/O PAIN OR DISCOMFORT. NO ACUTE 
DISTRESS NOTED. CONTINENT ON BOWEL AND BLADDER. ALL SAFETY MEASURES ON PLACE. BOTH BED SIDE 
RAILS UP. PLACE CALL LIGHT WITH IN REACH. WILL CONTINUE TO MONITOR.

## 2021-12-12 NOTE — NUR
RN NOTE 



ROUNDS MADE, PATIENT IN BED RESTING  WITH NC AT 2L NO REPORTS OF RESPIRATORY DISTRESS. 
SAFETY PROTOCOL IN PLACE, BED IN LOWEST POSITION, CALL LIGHT WITHIN REACH, SIDE RAILS UP X2.

## 2021-12-13 VITALS — SYSTOLIC BLOOD PRESSURE: 142 MMHG | DIASTOLIC BLOOD PRESSURE: 87 MMHG

## 2021-12-13 VITALS — SYSTOLIC BLOOD PRESSURE: 120 MMHG | DIASTOLIC BLOOD PRESSURE: 76 MMHG

## 2021-12-13 VITALS — DIASTOLIC BLOOD PRESSURE: 96 MMHG | SYSTOLIC BLOOD PRESSURE: 128 MMHG

## 2021-12-13 LAB
BASOPHILS # BLD AUTO: 0 K/UL (ref 0–0.2)
BASOPHILS NFR BLD AUTO: 0.1 % (ref 0–2)
BUN SERPL-MCNC: 21 MG/DL (ref 7–18)
CALCIUM SERPL-MCNC: 9.2 MG/DL (ref 8.5–10.1)
CHLORIDE SERPL-SCNC: 101 MMOL/L (ref 98–107)
CO2 SERPL-SCNC: 28 MMOL/L (ref 21–32)
CREAT SERPL-MCNC: 1.1 MG/DL (ref 0.6–1.3)
EOSINOPHIL NFR BLD AUTO: 0 % (ref 0–6)
GLUCOSE SERPL-MCNC: 123 MG/DL (ref 74–106)
HCT VFR BLD AUTO: 49 % (ref 39–51)
HGB BLD-MCNC: 16.4 G/DL (ref 13.5–17.5)
LYMPHOCYTES NFR BLD AUTO: 0.5 K/UL (ref 0.8–4.8)
LYMPHOCYTES NFR BLD AUTO: 2.6 % (ref 20–44)
MAGNESIUM SERPL-MCNC: 2.5 MG/DL (ref 1.8–2.4)
MCHC RBC AUTO-ENTMCNC: 34 G/DL (ref 31–36)
MCV RBC AUTO: 87 FL (ref 80–96)
MONOCYTES NFR BLD AUTO: 0.4 K/UL (ref 0.1–1.3)
MONOCYTES NFR BLD AUTO: 2 % (ref 2–12)
NEUTROPHILS # BLD AUTO: 19.3 K/UL (ref 1.8–8.9)
NEUTROPHILS NFR BLD AUTO: 95.3 % (ref 43–81)
PHOSPHATE SERPL-MCNC: 4.2 MG/DL (ref 2.5–4.9)
PLATELET # BLD AUTO: 339 K/UL (ref 150–450)
POTASSIUM SERPL-SCNC: 4.3 MMOL/L (ref 3.5–5.1)
RBC # BLD AUTO: 5.63 MIL/UL (ref 4.5–6)
SODIUM SERPL-SCNC: 138 MMOL/L (ref 136–145)
WBC NRBC COR # BLD AUTO: 20.3 K/UL (ref 4.3–11)

## 2021-12-13 RX ADMIN — IPRATROPIUM BROMIDE AND ALBUTEROL SULFATE SCH INHALER: 103; 18 AEROSOL, METERED RESPIRATORY (INHALATION) at 21:38

## 2021-12-13 RX ADMIN — TEMAZEPAM PRN MG: 15 CAPSULE ORAL at 23:26

## 2021-12-13 RX ADMIN — IPRATROPIUM BROMIDE AND ALBUTEROL SULFATE SCH INHALER: 103; 18 AEROSOL, METERED RESPIRATORY (INHALATION) at 12:34

## 2021-12-13 RX ADMIN — ALBUTEROL SULFATE SCH GM: 90 AEROSOL, METERED RESPIRATORY (INHALATION) at 02:15

## 2021-12-13 RX ADMIN — IPRATROPIUM BROMIDE AND ALBUTEROL SULFATE SCH INHALER: 103; 18 AEROSOL, METERED RESPIRATORY (INHALATION) at 11:30

## 2021-12-13 RX ADMIN — BUDESONIDE SCH MG: 0.5 SUSPENSION RESPIRATORY (INHALATION) at 07:05

## 2021-12-13 RX ADMIN — BUDESONIDE SCH MG: 0.5 SUSPENSION RESPIRATORY (INHALATION) at 15:45

## 2021-12-13 NOTE — NUR
2010 Patient awake in bed watching TV. No complain of SOB or difficulty breathing.  
Tolerates O2 at 2L per NC.  Able to move in bed independently.  HOB elevated for maximum 
oxygenation.  IV access on right AC intact and patent.  Call light placed within reach and 
instructed patient to call for assistance.

## 2021-12-13 NOTE — NUR
RN CLOSING NOTES:



PATIENT IN BED AWAKE, ALERT, ORIENTED X4 AND VERBALLY RESPONSIVE. ON O2 2L/MIN VIA N/C. O2 
SAT 94%.

 NOTED ON AND OFF NON-PRODUCTIVE COUGH. BUT REFUSED COUGH MEDICATION. BREATHING EVEN AND 
UNLABORED. IV ACCESS ON RT WRIST LEAKING. NEW IV SITE INSERTED ON RAC#22G INTACT AND PATENT. 
NO S/S OF INFILTRATIONS. NO C/O PAIN OR DISCOMFORT. NO ACUTE DISTRESS NOTED. CONTINENT ON 
BOWEL AND BLADDER. ABLE TO GO TO RESTROOM W/ ASSIST. ALL SAFETY MEASURES ON PLACE. BOTH SIDE 
RAILS UP. BED IN LOW POSITION AND LOCKED. PLACE CALL LIGHT WITH IN REACH. WILL ENDORSE TO 
MORNING SHIFT NURSE.

## 2021-12-13 NOTE — NUR
RN CLOSING NOTE





PT IS RESTING COMFORTABLY IN BED. PT IS ON 2L O2 VIA NC TOLERATING WELL WITH NO SIGNS OF 
DISTRESS OR LABORED BREATHING. PT IS A/OX4 AND MS AND AMBULATORY. PT IS ON REGULAR DIET WITH 
R AC #22G PATENT AND FLUSHING. BED IS LOCKED IN LOWEST POSITION X2 GUARD RAILS, CALL BELL 
WITHIN REACH, AND ALL HOSPITAL SAFETY PRECAUTIONS ARE IN PLACE. WILL ENDORSE TO NIGHT SHIFT 
NURSE FOR GINO.

## 2021-12-13 NOTE — NUR
RN MORNING NOTES



PT RECEIVED IN BED SLEEPING. PT IS ON 2L O2 VIA NC TOLERATING WELL WITH NO SIGNS OF DISTRESS 
OR LABORED BREATHING. PT IS A/OX4 AND MS AND AMBULATORY. PT IS ON REGULAR DIET WITH R AC 
#22G PATENT AND FLUSHING. BED IS LOCKED IN LOWEST POSITION X2 GUARD RAILS, CALL BELL WITHIN 
REACH, AND ALL HOSPITAL SAFETY PRECAUTIONS ARE IN PLACE. WILL CONTINUE TO MONITOR THIS 
SHIFT.

## 2021-12-14 VITALS — SYSTOLIC BLOOD PRESSURE: 124 MMHG | DIASTOLIC BLOOD PRESSURE: 91 MMHG

## 2021-12-14 VITALS — DIASTOLIC BLOOD PRESSURE: 96 MMHG | SYSTOLIC BLOOD PRESSURE: 132 MMHG

## 2021-12-14 VITALS — SYSTOLIC BLOOD PRESSURE: 131 MMHG | DIASTOLIC BLOOD PRESSURE: 94 MMHG

## 2021-12-14 LAB
BASOPHILS # BLD AUTO: 0 K/UL (ref 0–0.2)
BASOPHILS NFR BLD AUTO: 0.2 % (ref 0–2)
BUN SERPL-MCNC: 25 MG/DL (ref 7–18)
CALCIUM SERPL-MCNC: 8.9 MG/DL (ref 8.5–10.1)
CHLORIDE SERPL-SCNC: 100 MMOL/L (ref 98–107)
CO2 SERPL-SCNC: 28 MMOL/L (ref 21–32)
CREAT SERPL-MCNC: 1 MG/DL (ref 0.6–1.3)
EOSINOPHIL NFR BLD AUTO: 0 % (ref 0–6)
GLUCOSE SERPL-MCNC: 126 MG/DL (ref 74–106)
HCT VFR BLD AUTO: 50 % (ref 39–51)
HGB BLD-MCNC: 16.9 G/DL (ref 13.5–17.5)
LYMPHOCYTES NFR BLD AUTO: 0.5 K/UL (ref 0.8–4.8)
LYMPHOCYTES NFR BLD AUTO: 3.2 % (ref 20–44)
MAGNESIUM SERPL-MCNC: 2.5 MG/DL (ref 1.8–2.4)
MCHC RBC AUTO-ENTMCNC: 34 G/DL (ref 31–36)
MCV RBC AUTO: 87 FL (ref 80–96)
MONOCYTES NFR BLD AUTO: 0.4 K/UL (ref 0.1–1.3)
MONOCYTES NFR BLD AUTO: 2.7 % (ref 2–12)
NEUTROPHILS # BLD AUTO: 14.9 K/UL (ref 1.8–8.9)
NEUTROPHILS NFR BLD AUTO: 93.9 % (ref 43–81)
PHOSPHATE SERPL-MCNC: 4.5 MG/DL (ref 2.5–4.9)
PLATELET # BLD AUTO: 349 K/UL (ref 150–450)
POTASSIUM SERPL-SCNC: 4.3 MMOL/L (ref 3.5–5.1)
RBC # BLD AUTO: 5.77 MIL/UL (ref 4.5–6)
SODIUM SERPL-SCNC: 137 MMOL/L (ref 136–145)
WBC NRBC COR # BLD AUTO: 15.9 K/UL (ref 4.3–11)

## 2021-12-14 RX ADMIN — IPRATROPIUM BROMIDE AND ALBUTEROL SULFATE SCH INHALER: 103; 18 AEROSOL, METERED RESPIRATORY (INHALATION) at 04:46

## 2021-12-14 RX ADMIN — IPRATROPIUM BROMIDE AND ALBUTEROL SULFATE SCH INHALER: 103; 18 AEROSOL, METERED RESPIRATORY (INHALATION) at 07:42

## 2021-12-14 NOTE — NUR
RN NOTE 



PATIENT ADVISED AGAINST LEAVING AMA, PT INSISTED AND SIGNED AMA FORM, FORM PLACED IN CHART 
DOCTOR NOTIFIED.

## 2021-12-14 NOTE — NUR
RN NOTE 



DISCHARGE ORDER RECEIVED AS PATIENT LEFT AMA, CALLED PATIENT AND INFORMED THEM PRESCRIPTIONS 
WILL BE SENT TO [facility].

## 2021-12-14 NOTE — NUR
0000 Patient sleeping comfortably. No signs of distress noted.  Able to turn and reposition 
self independently.

## 2021-12-14 NOTE — NUR
0400 No acute changes in condition noted.  No c/o sob or difficulty breathing when asked.  
Turns and repositions self. Kept comfortable.  Needs anticipated.

## 2021-12-14 NOTE — NUR
RN MORNING NOTES



PT RECEIVED IN BED SLEEPING. PT IS ON 2L O2 VIA NC TOLERATING WELL WITH NO SIGNS OF DISTRESS 
OR LABORED BREATHING. PT IS A/OX4 AND MS AND AMBULATORY. PT IS ON REGULAR DIET WITH R AC 
#22G PATENT AND FLUSHING. BED IS LOCKED IN LOWEST POSITION X2 GUARD RAILS, CALL BELL WITHIN 
REACH, AND ALL HOSPITAL SAFETY PRECAUTIONS ARE IN PLACE..

## 2022-01-29 ENCOUNTER — HOSPITAL ENCOUNTER (EMERGENCY)
Dept: HOSPITAL 54 - ER | Age: 52
Discharge: HOME | End: 2022-01-29
Payer: MEDICAID

## 2022-01-29 VITALS — WEIGHT: 204 LBS | HEIGHT: 61 IN | BODY MASS INDEX: 38.51 KG/M2

## 2022-01-29 VITALS — SYSTOLIC BLOOD PRESSURE: 137 MMHG | DIASTOLIC BLOOD PRESSURE: 94 MMHG

## 2022-01-29 DIAGNOSIS — J44.9: Primary | ICD-10-CM

## 2022-01-29 DIAGNOSIS — Z60.2: ICD-10-CM

## 2022-01-29 DIAGNOSIS — Z98.890: ICD-10-CM

## 2022-01-29 DIAGNOSIS — Z79.899: ICD-10-CM

## 2022-01-29 DIAGNOSIS — Z87.891: ICD-10-CM

## 2022-01-29 RX ADMIN — ALBUTEROL SULFATE ONE MG: 2.5 SOLUTION RESPIRATORY (INHALATION) at 05:30

## 2022-01-29 SDOH — SOCIAL STABILITY - SOCIAL INSECURITY: PROBLEMS RELATED TO LIVING ALONE: Z60.2

## 2022-01-29 NOTE — NUR
PT BIBS C/O SOB S/P COPD EXACERBATION "I RAN OUT OF ALBUTEROL". PATIENT ALERT 
AND ORIENTED X3 AMBULATORY SATTING AT 94%. PATIENT PLACED IN BED 07 ON MONITOR 
AND POX.

## 2022-01-29 NOTE — NUR
PT DISCHARGED HOME IN STABLE CONDITION. PT REPORTS IMPROVEMENT IN BREATHING, 
BREATHING EVEN AND UNLABORED WITH A RR OF 18 AND 98% RA. LUNG SOUNDS IMPROVED 
BILATERALLY.WRITTEN AND VERBAL DISCHARGE INSTRUCTIONS PROVIDED AND PT 
VERBALIZES UNDERSTANDING OF INSTRUCTIONS. PT AMBULATED OUT OF ER WITHOUT 
INCIDENT.

## 2022-02-03 ENCOUNTER — HOSPITAL ENCOUNTER (INPATIENT)
Dept: HOSPITAL 54 - ER | Age: 52
LOS: 3 days | Discharge: HOME | DRG: 140 | End: 2022-02-06
Attending: NURSE PRACTITIONER | Admitting: NURSE PRACTITIONER
Payer: MEDICAID

## 2022-02-03 VITALS — WEIGHT: 205 LBS | HEIGHT: 71 IN | BODY MASS INDEX: 28.7 KG/M2

## 2022-02-03 VITALS — SYSTOLIC BLOOD PRESSURE: 136 MMHG | DIASTOLIC BLOOD PRESSURE: 102 MMHG

## 2022-02-03 DIAGNOSIS — T38.0X5A: ICD-10-CM

## 2022-02-03 DIAGNOSIS — J96.01: ICD-10-CM
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
## 2022-02-03 DIAGNOSIS — Z20.822: ICD-10-CM

## 2022-02-03 DIAGNOSIS — E87.6: ICD-10-CM

## 2022-02-03 DIAGNOSIS — D72.829: ICD-10-CM

## 2022-02-03 DIAGNOSIS — Z79.51: ICD-10-CM

## 2022-02-03 DIAGNOSIS — Z83.3: ICD-10-CM

## 2022-02-03 DIAGNOSIS — Z87.891: ICD-10-CM

## 2022-02-03 DIAGNOSIS — I10: ICD-10-CM

## 2022-02-03 DIAGNOSIS — J44.1: Primary | ICD-10-CM

## 2022-02-03 LAB
BASOPHILS # BLD AUTO: 0.1 K/UL (ref 0–0.2)
BASOPHILS NFR BLD AUTO: 1 % (ref 0–2)
BUN SERPL-MCNC: 11 MG/DL (ref 7–18)
CALCIUM SERPL-MCNC: 8.6 MG/DL (ref 8.5–10.1)
CHLORIDE SERPL-SCNC: 105 MMOL/L (ref 98–107)
CO2 SERPL-SCNC: 27 MMOL/L (ref 21–32)
CREAT SERPL-MCNC: 0.9 MG/DL (ref 0.6–1.3)
EOSINOPHIL NFR BLD AUTO: 3.4 % (ref 0–6)
GLUCOSE SERPL-MCNC: 121 MG/DL (ref 74–106)
HCT VFR BLD AUTO: 43 % (ref 39–51)
HGB BLD-MCNC: 14.6 G/DL (ref 13.5–17.5)
LYMPHOCYTES NFR BLD AUTO: 0.7 K/UL (ref 0.8–4.8)
LYMPHOCYTES NFR BLD AUTO: 9.4 % (ref 20–44)
MCHC RBC AUTO-ENTMCNC: 34 G/DL (ref 31–36)
MCV RBC AUTO: 87 FL (ref 80–96)
MONOCYTES NFR BLD AUTO: 0.2 K/UL (ref 0.1–1.3)
MONOCYTES NFR BLD AUTO: 2.3 % (ref 2–12)
NEUTROPHILS # BLD AUTO: 6.2 K/UL (ref 1.8–8.9)
NEUTROPHILS NFR BLD AUTO: 83.9 % (ref 43–81)
PLATELET # BLD AUTO: 380 K/UL (ref 150–450)
POTASSIUM SERPL-SCNC: 3.4 MMOL/L (ref 3.5–5.1)
RBC # BLD AUTO: 4.99 MIL/UL (ref 4.5–6)
SODIUM SERPL-SCNC: 139 MMOL/L (ref 136–145)
WBC NRBC COR # BLD AUTO: 7.4 K/UL (ref 4.3–11)

## 2022-02-03 PROCEDURE — G0378 HOSPITAL OBSERVATION PER HR: HCPCS

## 2022-02-03 PROCEDURE — C9803 HOPD COVID-19 SPEC COLLECT: HCPCS

## 2022-02-03 RX ADMIN — ENOXAPARIN SODIUM SCH MG: 40 INJECTION SUBCUTANEOUS at 14:17

## 2022-02-03 RX ADMIN — Medication SCH MG: at 23:49

## 2022-02-03 RX ADMIN — DOXYCYCLINE HYCLATE SCH MG: 100 TABLET, COATED ORAL at 21:00

## 2022-02-03 RX ADMIN — Medication SCH MG: at 16:18

## 2022-02-03 RX ADMIN — ZOLPIDEM TARTRATE PRN MG: 5 TABLET, FILM COATED ORAL at 21:00

## 2022-02-03 RX ADMIN — Medication SCH MG: at 19:58

## 2022-02-03 RX ADMIN — MAGNESIUM SULFATE IN DEXTROSE ONE MLS/HR: 10 INJECTION, SOLUTION INTRAVENOUS at 07:58

## 2022-02-03 RX ADMIN — ALBUTEROL SULFATE SCH MG: 2.5 SOLUTION RESPIRATORY (INHALATION) at 16:18

## 2022-02-03 RX ADMIN — ALBUTEROL SULFATE SCH MG: 2.5 SOLUTION RESPIRATORY (INHALATION) at 23:49

## 2022-02-03 RX ADMIN — ALBUTEROL SULFATE SCH MG: 2.5 SOLUTION RESPIRATORY (INHALATION) at 19:58

## 2022-02-04 VITALS — DIASTOLIC BLOOD PRESSURE: 104 MMHG | SYSTOLIC BLOOD PRESSURE: 158 MMHG

## 2022-02-04 VITALS — DIASTOLIC BLOOD PRESSURE: 112 MMHG | SYSTOLIC BLOOD PRESSURE: 162 MMHG

## 2022-02-04 VITALS — SYSTOLIC BLOOD PRESSURE: 148 MMHG | DIASTOLIC BLOOD PRESSURE: 81 MMHG

## 2022-02-04 VITALS — DIASTOLIC BLOOD PRESSURE: 89 MMHG | SYSTOLIC BLOOD PRESSURE: 144 MMHG

## 2022-02-04 VITALS — SYSTOLIC BLOOD PRESSURE: 144 MMHG | DIASTOLIC BLOOD PRESSURE: 104 MMHG

## 2022-02-04 VITALS — DIASTOLIC BLOOD PRESSURE: 75 MMHG | SYSTOLIC BLOOD PRESSURE: 147 MMHG

## 2022-02-04 LAB
BASOPHILS # BLD AUTO: 0 K/UL (ref 0–0.2)
BASOPHILS NFR BLD AUTO: 0.2 % (ref 0–2)
BUN SERPL-MCNC: 14 MG/DL (ref 7–18)
CALCIUM SERPL-MCNC: 9.1 MG/DL (ref 8.5–10.1)
CHLORIDE SERPL-SCNC: 104 MMOL/L (ref 98–107)
CHOLEST SERPL-MCNC: 200 MG/DL (ref ?–200)
CO2 SERPL-SCNC: 25 MMOL/L (ref 21–32)
CREAT SERPL-MCNC: 0.9 MG/DL (ref 0.6–1.3)
EOSINOPHIL NFR BLD AUTO: 0 % (ref 0–6)
GLUCOSE SERPL-MCNC: 142 MG/DL (ref 74–106)
HCT VFR BLD AUTO: 44 % (ref 39–51)
HDLC SERPL-MCNC: 98 MG/DL (ref 40–60)
HGB BLD-MCNC: 15.1 G/DL (ref 13.5–17.5)
LDLC SERPL DIRECT ASSAY-MCNC: 85 MG/DL (ref 0–99)
LYMPHOCYTES NFR BLD AUTO: 0.7 K/UL (ref 0.8–4.8)
LYMPHOCYTES NFR BLD AUTO: 5.8 % (ref 20–44)
MAGNESIUM SERPL-MCNC: 2.5 MG/DL (ref 1.8–2.4)
MCHC RBC AUTO-ENTMCNC: 34 G/DL (ref 31–36)
MCV RBC AUTO: 86 FL (ref 80–96)
MONOCYTES NFR BLD AUTO: 0.5 K/UL (ref 0.1–1.3)
MONOCYTES NFR BLD AUTO: 4.1 % (ref 2–12)
NEUTROPHILS # BLD AUTO: 10.2 K/UL (ref 1.8–8.9)
NEUTROPHILS NFR BLD AUTO: 89.9 % (ref 43–81)
PLATELET # BLD AUTO: 432 K/UL (ref 150–450)
POTASSIUM SERPL-SCNC: 4.1 MMOL/L (ref 3.5–5.1)
RBC # BLD AUTO: 5.18 MIL/UL (ref 4.5–6)
SODIUM SERPL-SCNC: 137 MMOL/L (ref 136–145)
TRIGL SERPL-MCNC: 48 MG/DL (ref 30–150)
WBC NRBC COR # BLD AUTO: 11.3 K/UL (ref 4.3–11)

## 2022-02-04 RX ADMIN — DOXYCYCLINE HYCLATE SCH MG: 100 TABLET, COATED ORAL at 21:01

## 2022-02-04 RX ADMIN — ALBUTEROL SULFATE SCH MG: 2.5 SOLUTION RESPIRATORY (INHALATION) at 20:27

## 2022-02-04 RX ADMIN — Medication SCH MG: at 15:01

## 2022-02-04 RX ADMIN — ALBUTEROL SULFATE SCH MG: 2.5 SOLUTION RESPIRATORY (INHALATION) at 23:09

## 2022-02-04 RX ADMIN — ALBUTEROL SULFATE SCH MG: 2.5 SOLUTION RESPIRATORY (INHALATION) at 07:43

## 2022-02-04 RX ADMIN — PANTOPRAZOLE SODIUM SCH MG: 40 TABLET, DELAYED RELEASE ORAL at 09:24

## 2022-02-04 RX ADMIN — Medication SCH MG: at 03:50

## 2022-02-04 RX ADMIN — ENOXAPARIN SODIUM SCH MG: 40 INJECTION SUBCUTANEOUS at 15:36

## 2022-02-04 RX ADMIN — Medication SCH MG: at 07:43

## 2022-02-04 RX ADMIN — ALBUTEROL SULFATE SCH MG: 2.5 SOLUTION RESPIRATORY (INHALATION) at 03:50

## 2022-02-04 RX ADMIN — ALBUTEROL SULFATE SCH MG: 2.5 SOLUTION RESPIRATORY (INHALATION) at 10:55

## 2022-02-04 RX ADMIN — ZOLPIDEM TARTRATE PRN MG: 5 TABLET, FILM COATED ORAL at 21:39

## 2022-02-04 RX ADMIN — Medication SCH MG: at 10:55

## 2022-02-04 RX ADMIN — DOXYCYCLINE HYCLATE SCH MG: 100 TABLET, COATED ORAL at 09:24

## 2022-02-04 RX ADMIN — Medication SCH MG: at 23:09

## 2022-02-04 RX ADMIN — ALBUTEROL SULFATE SCH MG: 2.5 SOLUTION RESPIRATORY (INHALATION) at 15:01

## 2022-02-04 RX ADMIN — Medication SCH MG: at 20:27

## 2022-02-05 VITALS — DIASTOLIC BLOOD PRESSURE: 98 MMHG | SYSTOLIC BLOOD PRESSURE: 148 MMHG

## 2022-02-05 VITALS — DIASTOLIC BLOOD PRESSURE: 81 MMHG | SYSTOLIC BLOOD PRESSURE: 142 MMHG

## 2022-02-05 VITALS — SYSTOLIC BLOOD PRESSURE: 142 MMHG | DIASTOLIC BLOOD PRESSURE: 94 MMHG

## 2022-02-05 VITALS — DIASTOLIC BLOOD PRESSURE: 90 MMHG | SYSTOLIC BLOOD PRESSURE: 155 MMHG

## 2022-02-05 VITALS — SYSTOLIC BLOOD PRESSURE: 139 MMHG | DIASTOLIC BLOOD PRESSURE: 93 MMHG

## 2022-02-05 LAB
BASOPHILS # BLD AUTO: 0.1 K/UL (ref 0–0.2)
BASOPHILS NFR BLD AUTO: 0.5 % (ref 0–2)
BUN SERPL-MCNC: 22 MG/DL (ref 7–18)
CALCIUM SERPL-MCNC: 9.7 MG/DL (ref 8.5–10.1)
CHLORIDE SERPL-SCNC: 102 MMOL/L (ref 98–107)
CO2 SERPL-SCNC: 27 MMOL/L (ref 21–32)
CREAT SERPL-MCNC: 1 MG/DL (ref 0.6–1.3)
EOSINOPHIL NFR BLD AUTO: 0.1 % (ref 0–6)
GLUCOSE SERPL-MCNC: 110 MG/DL (ref 74–106)
HCT VFR BLD AUTO: 45 % (ref 39–51)
HGB BLD-MCNC: 15.1 G/DL (ref 13.5–17.5)
LYMPHOCYTES NFR BLD AUTO: 1.4 K/UL (ref 0.8–4.8)
LYMPHOCYTES NFR BLD AUTO: 7.1 % (ref 20–44)
MAGNESIUM SERPL-MCNC: 2.8 MG/DL (ref 1.8–2.4)
MCHC RBC AUTO-ENTMCNC: 34 G/DL (ref 31–36)
MCV RBC AUTO: 86 FL (ref 80–96)
MONOCYTES NFR BLD AUTO: 1.2 K/UL (ref 0.1–1.3)
MONOCYTES NFR BLD AUTO: 6 % (ref 2–12)
NEUTROPHILS # BLD AUTO: 17.4 K/UL (ref 1.8–8.9)
NEUTROPHILS NFR BLD AUTO: 86.3 % (ref 43–81)
PLATELET # BLD AUTO: 426 K/UL (ref 150–450)
POTASSIUM SERPL-SCNC: 4 MMOL/L (ref 3.5–5.1)
RBC # BLD AUTO: 5.23 MIL/UL (ref 4.5–6)
SODIUM SERPL-SCNC: 137 MMOL/L (ref 136–145)
WBC NRBC COR # BLD AUTO: 20.1 K/UL (ref 4.3–11)

## 2022-02-05 RX ADMIN — Medication SCH MG: at 07:35

## 2022-02-05 RX ADMIN — ENOXAPARIN SODIUM SCH MG: 40 INJECTION SUBCUTANEOUS at 13:12

## 2022-02-05 RX ADMIN — ALBUTEROL SULFATE SCH MG: 2.5 SOLUTION RESPIRATORY (INHALATION) at 07:35

## 2022-02-05 RX ADMIN — ALBUTEROL SULFATE SCH MG: 2.5 SOLUTION RESPIRATORY (INHALATION) at 14:10

## 2022-02-05 RX ADMIN — Medication SCH MG: at 04:02

## 2022-02-05 RX ADMIN — ALBUTEROL SULFATE SCH MG: 2.5 SOLUTION RESPIRATORY (INHALATION) at 23:46

## 2022-02-05 RX ADMIN — PANTOPRAZOLE SODIUM SCH MG: 40 TABLET, DELAYED RELEASE ORAL at 08:43

## 2022-02-05 RX ADMIN — ZOLPIDEM TARTRATE PRN MG: 5 TABLET, FILM COATED ORAL at 21:03

## 2022-02-05 RX ADMIN — Medication SCH MG: at 23:45

## 2022-02-05 RX ADMIN — ALBUTEROL SULFATE SCH MG: 2.5 SOLUTION RESPIRATORY (INHALATION) at 17:04

## 2022-02-05 RX ADMIN — Medication SCH MG: at 19:54

## 2022-02-05 RX ADMIN — DOXYCYCLINE HYCLATE SCH MG: 100 TABLET, COATED ORAL at 21:03

## 2022-02-05 RX ADMIN — DOXYCYCLINE HYCLATE SCH MG: 100 TABLET, COATED ORAL at 08:43

## 2022-02-05 RX ADMIN — ALBUTEROL SULFATE SCH MG: 2.5 SOLUTION RESPIRATORY (INHALATION) at 19:54

## 2022-02-05 RX ADMIN — Medication SCH MG: at 14:11

## 2022-02-05 RX ADMIN — ALBUTEROL SULFATE SCH MG: 2.5 SOLUTION RESPIRATORY (INHALATION) at 04:02

## 2022-02-05 RX ADMIN — Medication SCH MG: at 17:04

## 2022-02-06 VITALS — DIASTOLIC BLOOD PRESSURE: 95 MMHG | SYSTOLIC BLOOD PRESSURE: 142 MMHG

## 2022-02-06 VITALS — DIASTOLIC BLOOD PRESSURE: 93 MMHG | SYSTOLIC BLOOD PRESSURE: 157 MMHG

## 2022-02-06 VITALS — SYSTOLIC BLOOD PRESSURE: 122 MMHG | DIASTOLIC BLOOD PRESSURE: 88 MMHG

## 2022-02-06 VITALS — DIASTOLIC BLOOD PRESSURE: 97 MMHG | SYSTOLIC BLOOD PRESSURE: 146 MMHG

## 2022-02-06 LAB
BASOPHILS # BLD AUTO: 0 K/UL (ref 0–0.2)
BASOPHILS NFR BLD AUTO: 0.3 % (ref 0–2)
BUN SERPL-MCNC: 23 MG/DL (ref 7–18)
CALCIUM SERPL-MCNC: 9.1 MG/DL (ref 8.5–10.1)
CHLORIDE SERPL-SCNC: 102 MMOL/L (ref 98–107)
CO2 SERPL-SCNC: 27 MMOL/L (ref 21–32)
CREAT SERPL-MCNC: 1 MG/DL (ref 0.6–1.3)
EOSINOPHIL NFR BLD AUTO: 0.6 % (ref 0–6)
GLUCOSE SERPL-MCNC: 86 MG/DL (ref 74–106)
HCT VFR BLD AUTO: 49 % (ref 39–51)
HGB BLD-MCNC: 16.3 G/DL (ref 13.5–17.5)
LYMPHOCYTES NFR BLD AUTO: 2.4 K/UL (ref 0.8–4.8)
LYMPHOCYTES NFR BLD AUTO: 20.8 % (ref 20–44)
MAGNESIUM SERPL-MCNC: 2.6 MG/DL (ref 1.8–2.4)
MCHC RBC AUTO-ENTMCNC: 34 G/DL (ref 31–36)
MCV RBC AUTO: 86 FL (ref 80–96)
MONOCYTES NFR BLD AUTO: 1.1 K/UL (ref 0.1–1.3)
MONOCYTES NFR BLD AUTO: 9.7 % (ref 2–12)
NEUTROPHILS # BLD AUTO: 7.8 K/UL (ref 1.8–8.9)
NEUTROPHILS NFR BLD AUTO: 68.6 % (ref 43–81)
PLATELET # BLD AUTO: 409 K/UL (ref 150–450)
POTASSIUM SERPL-SCNC: 3.7 MMOL/L (ref 3.5–5.1)
RBC # BLD AUTO: 5.64 MIL/UL (ref 4.5–6)
SODIUM SERPL-SCNC: 140 MMOL/L (ref 136–145)
WBC NRBC COR # BLD AUTO: 11.4 K/UL (ref 4.3–11)

## 2022-02-06 RX ADMIN — ALBUTEROL SULFATE SCH MG: 2.5 SOLUTION RESPIRATORY (INHALATION) at 08:01

## 2022-02-06 RX ADMIN — Medication SCH MG: at 03:23

## 2022-02-06 RX ADMIN — ALBUTEROL SULFATE SCH MG: 2.5 SOLUTION RESPIRATORY (INHALATION) at 03:23

## 2022-02-06 RX ADMIN — PANTOPRAZOLE SODIUM SCH MG: 40 TABLET, DELAYED RELEASE ORAL at 08:10

## 2022-02-06 RX ADMIN — DOXYCYCLINE HYCLATE SCH MG: 100 TABLET, COATED ORAL at 08:10

## 2022-02-06 RX ADMIN — Medication SCH MG: at 08:01

## 2022-02-06 RX ADMIN — ALBUTEROL SULFATE SCH MG: 2.5 SOLUTION RESPIRATORY (INHALATION) at 11:09

## 2022-02-06 RX ADMIN — Medication SCH MG: at 11:09

## 2022-04-04 ENCOUNTER — HOSPITAL ENCOUNTER (EMERGENCY)
Dept: HOSPITAL 54 - ER | Age: 52
Discharge: HOME | End: 2022-04-04
Payer: MEDICAID

## 2022-04-04 VITALS — HEIGHT: 71 IN | BODY MASS INDEX: 28 KG/M2 | WEIGHT: 200 LBS

## 2022-04-04 VITALS — DIASTOLIC BLOOD PRESSURE: 98 MMHG | SYSTOLIC BLOOD PRESSURE: 128 MMHG

## 2022-04-04 DIAGNOSIS — Z79.51: ICD-10-CM

## 2022-04-04 DIAGNOSIS — Z79.52: ICD-10-CM

## 2022-04-04 DIAGNOSIS — J44.1: Primary | ICD-10-CM

## 2022-04-04 DIAGNOSIS — Z60.2: ICD-10-CM

## 2022-04-04 DIAGNOSIS — Z79.899: ICD-10-CM

## 2022-04-04 DIAGNOSIS — F17.200: ICD-10-CM

## 2022-04-04 PROCEDURE — 94640 AIRWAY INHALATION TREATMENT: CPT

## 2022-04-04 PROCEDURE — 99283 EMERGENCY DEPT VISIT LOW MDM: CPT

## 2022-04-04 SDOH — SOCIAL STABILITY - SOCIAL INSECURITY: PROBLEMS RELATED TO LIVING ALONE: Z60.2

## 2022-05-01 ENCOUNTER — HOSPITAL ENCOUNTER (EMERGENCY)
Dept: HOSPITAL 54 - ER | Age: 52
Discharge: HOME | End: 2022-05-01
Payer: MEDICAID

## 2022-05-01 VITALS — WEIGHT: 220 LBS | HEIGHT: 71 IN | BODY MASS INDEX: 30.8 KG/M2

## 2022-05-01 VITALS — DIASTOLIC BLOOD PRESSURE: 70 MMHG | SYSTOLIC BLOOD PRESSURE: 141 MMHG

## 2022-05-01 DIAGNOSIS — F17.200: ICD-10-CM

## 2022-05-01 DIAGNOSIS — Z79.899: ICD-10-CM

## 2022-05-01 DIAGNOSIS — J44.1: Primary | ICD-10-CM

## 2022-05-01 DIAGNOSIS — Z60.2: ICD-10-CM

## 2022-05-01 PROCEDURE — 94644 CONT INHLJ TX 1ST HOUR: CPT

## 2022-05-01 PROCEDURE — 99285 EMERGENCY DEPT VISIT HI MDM: CPT

## 2022-05-01 SDOH — SOCIAL STABILITY - SOCIAL INSECURITY: PROBLEMS RELATED TO LIVING ALONE: Z60.2

## 2022-05-23 ENCOUNTER — HOSPITAL ENCOUNTER (EMERGENCY)
Dept: HOSPITAL 54 - ER | Age: 52
LOS: 1 days | Discharge: HOME | End: 2022-05-24
Payer: MEDICAID

## 2022-05-23 VITALS — WEIGHT: 204 LBS | HEIGHT: 74 IN | BODY MASS INDEX: 26.18 KG/M2

## 2022-05-23 DIAGNOSIS — Z20.822: ICD-10-CM

## 2022-05-23 DIAGNOSIS — J44.1: Primary | ICD-10-CM

## 2022-05-23 DIAGNOSIS — R91.8: ICD-10-CM

## 2022-05-23 DIAGNOSIS — Z87.891: ICD-10-CM

## 2022-05-23 DIAGNOSIS — J98.01: ICD-10-CM

## 2022-05-23 DIAGNOSIS — I45.2: ICD-10-CM

## 2022-05-23 PROCEDURE — 87426 SARSCOV CORONAVIRUS AG IA: CPT

## 2022-05-23 PROCEDURE — 96365 THER/PROPH/DIAG IV INF INIT: CPT

## 2022-05-23 PROCEDURE — 83735 ASSAY OF MAGNESIUM: CPT

## 2022-05-23 PROCEDURE — 85025 COMPLETE CBC W/AUTO DIFF WBC: CPT

## 2022-05-23 PROCEDURE — 93005 ELECTROCARDIOGRAM TRACING: CPT

## 2022-05-23 PROCEDURE — 99285 EMERGENCY DEPT VISIT HI MDM: CPT

## 2022-05-23 PROCEDURE — 36415 COLL VENOUS BLD VENIPUNCTURE: CPT

## 2022-05-23 PROCEDURE — 96375 TX/PRO/DX INJ NEW DRUG ADDON: CPT

## 2022-05-23 PROCEDURE — 94644 CONT INHLJ TX 1ST HOUR: CPT

## 2022-05-23 PROCEDURE — 80048 BASIC METABOLIC PNL TOTAL CA: CPT

## 2022-05-23 PROCEDURE — 94645 CONT INHLJ TX EACH ADDL HOUR: CPT

## 2022-05-23 PROCEDURE — 84484 ASSAY OF TROPONIN QUANT: CPT

## 2022-05-23 PROCEDURE — C9803 HOPD COVID-19 SPEC COLLECT: HCPCS

## 2022-05-23 PROCEDURE — 71045 X-RAY EXAM CHEST 1 VIEW: CPT

## 2022-05-23 NOTE — NUR
BIBS.TO ER BED 6. AAOX4. NOT IN RESP DISTRESS, BREATHING EVEN AND UNLABORED. 
AMBULATORY ON STEADY GAIT. CAME IN FOR SOB FOR THE PAST 3 DAYS. PT REPORT HX OF 
COPD. TALKING IN FULL SENTENCES AND SATTING @ 95% ON RA. AWAITING MD FOR EVAL

## 2022-05-23 NOTE — NUR
PT AOX4. BIBSELF C/O SOB X5 DAYS. REQUESTING BREATHING TREATMENT, HX OF COPD. 
NO ACUTE DISTRESS NOTED. PLACED ON MONITOR AND PULSE OX. AWAITING MD FOR EVAL 
AND ORDERS.

## 2022-05-24 VITALS — SYSTOLIC BLOOD PRESSURE: 134 MMHG | DIASTOLIC BLOOD PRESSURE: 76 MMHG

## 2022-05-24 LAB
BASOPHILS # BLD AUTO: 0.1 K/UL (ref 0–0.2)
BASOPHILS NFR BLD AUTO: 1.3 % (ref 0–2)
BUN SERPL-MCNC: 16 MG/DL (ref 7–18)
CALCIUM SERPL-MCNC: 9.4 MG/DL (ref 8.5–10.1)
CHLORIDE SERPL-SCNC: 102 MMOL/L (ref 98–107)
CO2 SERPL-SCNC: 32 MMOL/L (ref 21–32)
CREAT SERPL-MCNC: 1.3 MG/DL (ref 0.6–1.3)
EOSINOPHIL NFR BLD AUTO: 5.9 % (ref 0–6)
GLUCOSE SERPL-MCNC: 143 MG/DL (ref 74–106)
HCT VFR BLD AUTO: 45 % (ref 39–51)
HGB BLD-MCNC: 15.5 G/DL (ref 13.5–17.5)
LYMPHOCYTES NFR BLD AUTO: 1.4 K/UL (ref 0.8–4.8)
LYMPHOCYTES NFR BLD AUTO: 16.4 % (ref 20–44)
MCHC RBC AUTO-ENTMCNC: 34 G/DL (ref 31–36)
MCV RBC AUTO: 85 FL (ref 80–96)
MONOCYTES NFR BLD AUTO: 0.5 K/UL (ref 0.1–1.3)
MONOCYTES NFR BLD AUTO: 5.4 % (ref 2–12)
NEUTROPHILS # BLD AUTO: 6.1 K/UL (ref 1.8–8.9)
NEUTROPHILS NFR BLD AUTO: 71 % (ref 43–81)
PLATELET # BLD AUTO: 299 K/UL (ref 150–450)
POTASSIUM SERPL-SCNC: 3.3 MMOL/L (ref 3.5–5.1)
RBC # BLD AUTO: 5.33 MIL/UL (ref 4.5–6)
SODIUM SERPL-SCNC: 139 MMOL/L (ref 136–145)
WBC NRBC COR # BLD AUTO: 8.6 K/UL (ref 4.3–11)

## 2022-05-24 RX ADMIN — MAGNESIUM SULFATE IN DEXTROSE SCH MLS/HR: 10 INJECTION, SOLUTION INTRAVENOUS at 00:00

## 2022-05-24 RX ADMIN — MAGNESIUM SULFATE IN DEXTROSE SCH MLS/HR: 10 INJECTION, SOLUTION INTRAVENOUS at 00:35

## 2022-06-16 ENCOUNTER — HOSPITAL ENCOUNTER (EMERGENCY)
Dept: HOSPITAL 54 - ER | Age: 52
Discharge: HOME | End: 2022-06-16
Payer: MEDICAID

## 2022-06-16 VITALS — HEIGHT: 67 IN | WEIGHT: 200 LBS | BODY MASS INDEX: 31.39 KG/M2

## 2022-06-16 DIAGNOSIS — J98.01: Primary | ICD-10-CM

## 2022-06-16 DIAGNOSIS — Z60.2: ICD-10-CM

## 2022-06-16 DIAGNOSIS — J44.9: ICD-10-CM

## 2022-06-16 DIAGNOSIS — Z79.899: ICD-10-CM

## 2022-06-16 DIAGNOSIS — F17.200: ICD-10-CM

## 2022-06-16 PROCEDURE — 99283 EMERGENCY DEPT VISIT LOW MDM: CPT

## 2022-06-16 PROCEDURE — 96372 THER/PROPH/DIAG INJ SC/IM: CPT

## 2022-06-16 PROCEDURE — 71045 X-RAY EXAM CHEST 1 VIEW: CPT

## 2022-06-16 PROCEDURE — 94640 AIRWAY INHALATION TREATMENT: CPT

## 2022-06-16 SDOH — SOCIAL STABILITY - SOCIAL INSECURITY: PROBLEMS RELATED TO LIVING ALONE: Z60.2

## 2022-06-16 NOTE — NUR
BIBSELF C/O SOB X 4 DAYS HX COPD, RAN OUT OF INHALER TODAY. PLACED COMFORTABLY 
IN BED. VITALS CHECKED.

## 2022-06-17 VITALS — DIASTOLIC BLOOD PRESSURE: 84 MMHG | SYSTOLIC BLOOD PRESSURE: 151 MMHG

## 2022-06-27 ENCOUNTER — HOSPITAL ENCOUNTER (EMERGENCY)
Dept: HOSPITAL 54 - ER | Age: 52
Discharge: HOME | End: 2022-06-27
Payer: MEDICAID

## 2022-06-27 VITALS — WEIGHT: 200 LBS | HEIGHT: 71 IN | BODY MASS INDEX: 28 KG/M2

## 2022-06-27 VITALS — DIASTOLIC BLOOD PRESSURE: 81 MMHG | SYSTOLIC BLOOD PRESSURE: 127 MMHG

## 2022-06-27 DIAGNOSIS — Z60.2: ICD-10-CM

## 2022-06-27 DIAGNOSIS — Z79.899: ICD-10-CM

## 2022-06-27 DIAGNOSIS — F17.200: ICD-10-CM

## 2022-06-27 DIAGNOSIS — J44.9: Primary | ICD-10-CM

## 2022-06-27 PROCEDURE — 94644 CONT INHLJ TX 1ST HOUR: CPT

## 2022-06-27 PROCEDURE — 71045 X-RAY EXAM CHEST 1 VIEW: CPT

## 2022-06-27 PROCEDURE — 94645 CONT INHLJ TX EACH ADDL HOUR: CPT

## 2022-06-27 PROCEDURE — 94799 UNLISTED PULMONARY SVC/PX: CPT

## 2022-06-27 PROCEDURE — 99285 EMERGENCY DEPT VISIT HI MDM: CPT

## 2022-06-27 SDOH — SOCIAL STABILITY - SOCIAL INSECURITY: PROBLEMS RELATED TO LIVING ALONE: Z60.2

## 2022-06-27 NOTE — NUR
BIBS FOR C/O COPD EXACERBATION. PLACED COMFORTABLY IN BED. VITALS CHECKED. 
ATTACHED TO MONITOR. +SOB, +WHEEZING ON BOTH LUNGFIELDS HEARD ROOM AIR PT SATS 
91% . ATTACHED TO OXYGEN AT 2LPM VIA NC. SATURATING 94%.

## 2022-08-01 ENCOUNTER — HOSPITAL ENCOUNTER (EMERGENCY)
Dept: HOSPITAL 54 - ER | Age: 52
LOS: 1 days | Discharge: HOME | End: 2022-08-02
Payer: MEDICAID

## 2022-08-01 VITALS — BODY MASS INDEX: 28.56 KG/M2 | WEIGHT: 204 LBS | HEIGHT: 71 IN

## 2022-08-01 DIAGNOSIS — Z87.891: ICD-10-CM

## 2022-08-01 DIAGNOSIS — Z60.2: ICD-10-CM

## 2022-08-01 DIAGNOSIS — Z79.899: ICD-10-CM

## 2022-08-01 DIAGNOSIS — Z98.890: ICD-10-CM

## 2022-08-01 DIAGNOSIS — J44.9: ICD-10-CM

## 2022-08-01 DIAGNOSIS — J98.01: Primary | ICD-10-CM

## 2022-08-01 PROCEDURE — 94644 CONT INHLJ TX 1ST HOUR: CPT

## 2022-08-01 PROCEDURE — 99285 EMERGENCY DEPT VISIT HI MDM: CPT

## 2022-08-01 SDOH — SOCIAL STABILITY - SOCIAL INSECURITY: PROBLEMS RELATED TO LIVING ALONE: Z60.2

## 2022-08-01 NOTE — NUR
BIBS FOR C/O COPD EXACERBATION SINCE YESTERDAY. RAN OUT OF INHALER. PT A/OX4. 
TOLERATING R/A AT 96% WITH SOB EXACERBATION. AMBULATORY WITH STEADY GAIT. 
CONNECTED PT TO POX AND MONITOR. SAFETY MEASURES IN PLACE.

## 2022-08-02 VITALS — SYSTOLIC BLOOD PRESSURE: 127 MMHG | DIASTOLIC BLOOD PRESSURE: 78 MMHG

## 2022-08-02 NOTE — NUR
Patient discharged to home in stable condition; denies SOB. Written and verbal 
after care instructions given. Patient verbalizes understanding of instruction. 
PT ambulatory with a steady gait

## 2022-08-29 ENCOUNTER — HOSPITAL ENCOUNTER (EMERGENCY)
Dept: HOSPITAL 54 - ER | Age: 52
LOS: 1 days | Discharge: STILL ADMITTED OR ADMINISTRATIVE | End: 2022-08-30
Payer: MEDICAID

## 2022-08-29 VITALS — WEIGHT: 200 LBS | HEIGHT: 71 IN | BODY MASS INDEX: 28 KG/M2

## 2022-08-29 DIAGNOSIS — J44.9: Primary | ICD-10-CM

## 2022-08-29 DIAGNOSIS — Z87.891: ICD-10-CM

## 2022-08-29 PROCEDURE — 94644 CONT INHLJ TX 1ST HOUR: CPT

## 2022-08-29 PROCEDURE — 99285 EMERGENCY DEPT VISIT HI MDM: CPT

## 2022-08-29 PROCEDURE — 71045 X-RAY EXAM CHEST 1 VIEW: CPT

## 2022-08-29 RX ADMIN — ALBUTEROL SULFATE ONE MG: 2.5 SOLUTION RESPIRATORY (INHALATION) at 23:02

## 2022-08-29 RX ADMIN — Medication ONE MG: at 23:02

## 2022-08-29 NOTE — NUR
BIBS FOR COPD EXACERBATION SINCE YESTERDAY. PT A/OX4. TOLERATING R/A AT 99%. 
CONNECTED PT TO POX AND MONITOR. SAFETY MEASURES IN PLACE.

## 2022-08-30 VITALS — SYSTOLIC BLOOD PRESSURE: 135 MMHG | DIASTOLIC BLOOD PRESSURE: 83 MMHG

## 2022-09-25 ENCOUNTER — HOSPITAL ENCOUNTER (EMERGENCY)
Dept: HOSPITAL 54 - ER | Age: 52
Discharge: HOME | End: 2022-09-25
Payer: MEDICAID

## 2022-09-25 VITALS — DIASTOLIC BLOOD PRESSURE: 95 MMHG | SYSTOLIC BLOOD PRESSURE: 152 MMHG

## 2022-09-25 VITALS — BODY MASS INDEX: 28 KG/M2 | HEIGHT: 71 IN | WEIGHT: 200 LBS

## 2022-09-25 DIAGNOSIS — Z98.890: ICD-10-CM

## 2022-09-25 DIAGNOSIS — Z79.899: ICD-10-CM

## 2022-09-25 DIAGNOSIS — F17.210: ICD-10-CM

## 2022-09-25 DIAGNOSIS — J44.1: Primary | ICD-10-CM

## 2022-09-25 PROCEDURE — 99283 EMERGENCY DEPT VISIT LOW MDM: CPT

## 2022-09-25 PROCEDURE — 71045 X-RAY EXAM CHEST 1 VIEW: CPT

## 2022-09-25 PROCEDURE — 94640 AIRWAY INHALATION TREATMENT: CPT

## 2022-09-25 PROCEDURE — 99406 BEHAV CHNG SMOKING 3-10 MIN: CPT

## 2022-09-25 NOTE — NUR
BIBS C/O SOB X1 DAY RAN OUT OF INHALER. +COUGHING HX OF COPD. PATIENT IS AAOX4. 
+ WHEEZING ON BILATERAL LUNGS. - CP DISTRESS. PLACED COMFORTABLY IN BED. VITALS 
CHECKED.

## 2022-10-15 ENCOUNTER — HOSPITAL ENCOUNTER (EMERGENCY)
Dept: HOSPITAL 54 - ER | Age: 52
Discharge: HOME | End: 2022-10-15
Payer: MEDICAID

## 2022-10-15 VITALS — DIASTOLIC BLOOD PRESSURE: 77 MMHG | SYSTOLIC BLOOD PRESSURE: 131 MMHG

## 2022-10-15 VITALS — WEIGHT: 170 LBS | BODY MASS INDEX: 28.32 KG/M2 | HEIGHT: 65 IN

## 2022-10-15 DIAGNOSIS — Z79.52: ICD-10-CM

## 2022-10-15 DIAGNOSIS — J44.1: Primary | ICD-10-CM

## 2022-10-15 DIAGNOSIS — Z79.899: ICD-10-CM

## 2022-10-15 DIAGNOSIS — R06.02: ICD-10-CM

## 2022-10-15 DIAGNOSIS — Z60.2: ICD-10-CM

## 2022-10-15 DIAGNOSIS — Z79.51: ICD-10-CM

## 2022-10-15 DIAGNOSIS — J44.9: ICD-10-CM

## 2022-10-15 LAB
BASOPHILS # BLD AUTO: 0.1 K/UL (ref 0–0.2)
BASOPHILS NFR BLD AUTO: 1 % (ref 0–2)
BUN SERPL-MCNC: 13 MG/DL (ref 7–18)
CALCIUM SERPL-MCNC: 8.9 MG/DL (ref 8.5–10.1)
CHLORIDE SERPL-SCNC: 106 MMOL/L (ref 98–107)
CO2 SERPL-SCNC: 28 MMOL/L (ref 21–32)
CREAT SERPL-MCNC: 0.9 MG/DL (ref 0.6–1.3)
EOSINOPHIL NFR BLD AUTO: 13.1 % (ref 0–6)
GLUCOSE SERPL-MCNC: 91 MG/DL (ref 74–106)
HCT VFR BLD AUTO: 46 % (ref 39–51)
HGB BLD-MCNC: 15.1 G/DL (ref 13.5–17.5)
LYMPHOCYTES NFR BLD AUTO: 1.5 K/UL (ref 0.8–4.8)
LYMPHOCYTES NFR BLD AUTO: 18.5 % (ref 20–44)
MCHC RBC AUTO-ENTMCNC: 33 G/DL (ref 31–36)
MCV RBC AUTO: 88 FL (ref 80–96)
MONOCYTES NFR BLD AUTO: 0.7 K/UL (ref 0.1–1.3)
MONOCYTES NFR BLD AUTO: 8.7 % (ref 2–12)
NEUTROPHILS # BLD AUTO: 4.6 K/UL (ref 1.8–8.9)
NEUTROPHILS NFR BLD AUTO: 58.7 % (ref 43–81)
PLATELET # BLD AUTO: 312 K/UL (ref 150–450)
POTASSIUM SERPL-SCNC: 3.2 MMOL/L (ref 3.5–5.1)
RBC # BLD AUTO: 5.29 MIL/UL (ref 4.5–6)
SODIUM SERPL-SCNC: 140 MMOL/L (ref 136–145)
WBC NRBC COR # BLD AUTO: 7.9 K/UL (ref 4.3–11)

## 2022-10-15 PROCEDURE — 36415 COLL VENOUS BLD VENIPUNCTURE: CPT

## 2022-10-15 PROCEDURE — 96361 HYDRATE IV INFUSION ADD-ON: CPT

## 2022-10-15 PROCEDURE — 96374 THER/PROPH/DIAG INJ IV PUSH: CPT

## 2022-10-15 PROCEDURE — 99285 EMERGENCY DEPT VISIT HI MDM: CPT

## 2022-10-15 PROCEDURE — 80048 BASIC METABOLIC PNL TOTAL CA: CPT

## 2022-10-15 PROCEDURE — 71045 X-RAY EXAM CHEST 1 VIEW: CPT

## 2022-10-15 PROCEDURE — 85025 COMPLETE CBC W/AUTO DIFF WBC: CPT

## 2022-10-15 PROCEDURE — 94640 AIRWAY INHALATION TREATMENT: CPT

## 2022-10-15 SDOH — SOCIAL STABILITY - SOCIAL INSECURITY: PROBLEMS RELATED TO LIVING ALONE: Z60.2

## 2023-04-17 ENCOUNTER — HOSPITAL ENCOUNTER (EMERGENCY)
Dept: HOSPITAL 54 - ER | Age: 53
LOS: 1 days | Discharge: HOME | End: 2023-04-18
Payer: MEDICAID

## 2023-04-17 VITALS — BODY MASS INDEX: 28.56 KG/M2 | WEIGHT: 204 LBS | HEIGHT: 71 IN

## 2023-04-17 DIAGNOSIS — F17.200: ICD-10-CM

## 2023-04-17 DIAGNOSIS — Z98.890: ICD-10-CM

## 2023-04-17 DIAGNOSIS — J44.1: Primary | ICD-10-CM

## 2023-04-17 DIAGNOSIS — Z79.899: ICD-10-CM

## 2023-04-17 DIAGNOSIS — Z60.2: ICD-10-CM

## 2023-04-17 PROCEDURE — 99285 EMERGENCY DEPT VISIT HI MDM: CPT

## 2023-04-17 PROCEDURE — 94644 CONT INHLJ TX 1ST HOUR: CPT

## 2023-04-17 SDOH — SOCIAL STABILITY - SOCIAL INSECURITY: PROBLEMS RELATED TO LIVING ALONE: Z60.2

## 2023-04-18 VITALS — DIASTOLIC BLOOD PRESSURE: 94 MMHG | SYSTOLIC BLOOD PRESSURE: 141 MMHG

## 2023-05-08 ENCOUNTER — HOSPITAL ENCOUNTER (EMERGENCY)
Dept: HOSPITAL 54 - ER | Age: 53
Discharge: HOME | End: 2023-05-08
Payer: MEDICAID

## 2023-05-08 VITALS — SYSTOLIC BLOOD PRESSURE: 145 MMHG | DIASTOLIC BLOOD PRESSURE: 80 MMHG

## 2023-05-08 VITALS — WEIGHT: 195 LBS | HEIGHT: 71 IN | BODY MASS INDEX: 27.3 KG/M2

## 2023-05-08 DIAGNOSIS — Z20.822: ICD-10-CM

## 2023-05-08 DIAGNOSIS — F17.200: ICD-10-CM

## 2023-05-08 DIAGNOSIS — J44.1: Primary | ICD-10-CM

## 2023-05-08 DIAGNOSIS — Z60.2: ICD-10-CM

## 2023-05-08 DIAGNOSIS — Z98.890: ICD-10-CM

## 2023-05-08 DIAGNOSIS — Z79.899: ICD-10-CM

## 2023-05-08 LAB
ALBUMIN SERPL BCP-MCNC: 3.9 G/DL (ref 3.4–5)
ALP SERPL-CCNC: 83 U/L (ref 46–116)
ALT SERPL W P-5'-P-CCNC: 27 U/L (ref 12–78)
AST SERPL W P-5'-P-CCNC: 19 U/L (ref 15–37)
BASOPHILS # BLD AUTO: 0.1 K/UL (ref 0–0.2)
BASOPHILS NFR BLD AUTO: 0.9 % (ref 0–2)
BILIRUB DIRECT SERPL-MCNC: 0.1 MG/DL (ref 0–0.2)
BILIRUB SERPL-MCNC: 0.7 MG/DL (ref 0.2–1)
BUN SERPL-MCNC: 13 MG/DL (ref 7–18)
CALCIUM SERPL-MCNC: 9.8 MG/DL (ref 8.5–10.1)
CHLORIDE SERPL-SCNC: 105 MMOL/L (ref 98–107)
CO2 SERPL-SCNC: 28 MMOL/L (ref 21–32)
CREAT SERPL-MCNC: 1 MG/DL (ref 0.6–1.3)
EOSINOPHIL NFR BLD AUTO: 4.6 % (ref 0–6)
GLUCOSE SERPL-MCNC: 111 MG/DL (ref 74–106)
HCT VFR BLD AUTO: 50 % (ref 39–51)
HGB BLD-MCNC: 16.3 G/DL (ref 13.5–17.5)
LYMPHOCYTES NFR BLD AUTO: 0.9 K/UL (ref 0.8–4.8)
LYMPHOCYTES NFR BLD AUTO: 9.4 % (ref 20–44)
MCHC RBC AUTO-ENTMCNC: 33 G/DL (ref 31–36)
MCV RBC AUTO: 88 FL (ref 80–96)
MONOCYTES NFR BLD AUTO: 0.5 K/UL (ref 0.1–1.3)
MONOCYTES NFR BLD AUTO: 5.2 % (ref 2–12)
NEUTROPHILS # BLD AUTO: 7.4 K/UL (ref 1.8–8.9)
NEUTROPHILS NFR BLD AUTO: 79.9 % (ref 43–81)
PLATELET # BLD AUTO: 316 K/UL (ref 150–450)
POTASSIUM SERPL-SCNC: 3.8 MMOL/L (ref 3.5–5.1)
PROT SERPL-MCNC: 7.7 G/DL (ref 6.4–8.2)
RBC # BLD AUTO: 5.71 MIL/UL (ref 4.5–6)
SODIUM SERPL-SCNC: 142 MMOL/L (ref 136–145)
WBC NRBC COR # BLD AUTO: 9.3 K/UL (ref 4.3–11)

## 2023-05-08 PROCEDURE — 83880 ASSAY OF NATRIURETIC PEPTIDE: CPT

## 2023-05-08 PROCEDURE — 85025 COMPLETE CBC W/AUTO DIFF WBC: CPT

## 2023-05-08 PROCEDURE — 36415 COLL VENOUS BLD VENIPUNCTURE: CPT

## 2023-05-08 PROCEDURE — 80048 BASIC METABOLIC PNL TOTAL CA: CPT

## 2023-05-08 PROCEDURE — 87426 SARSCOV CORONAVIRUS AG IA: CPT

## 2023-05-08 PROCEDURE — 84484 ASSAY OF TROPONIN QUANT: CPT

## 2023-05-08 PROCEDURE — C9803 HOPD COVID-19 SPEC COLLECT: HCPCS

## 2023-05-08 PROCEDURE — 94799 UNLISTED PULMONARY SVC/PX: CPT

## 2023-05-08 PROCEDURE — 71045 X-RAY EXAM CHEST 1 VIEW: CPT

## 2023-05-08 PROCEDURE — 99285 EMERGENCY DEPT VISIT HI MDM: CPT

## 2023-05-08 PROCEDURE — 93005 ELECTROCARDIOGRAM TRACING: CPT

## 2023-05-08 PROCEDURE — 80076 HEPATIC FUNCTION PANEL: CPT

## 2023-05-08 PROCEDURE — 94640 AIRWAY INHALATION TREATMENT: CPT

## 2023-05-08 SDOH — SOCIAL STABILITY - SOCIAL INSECURITY: PROBLEMS RELATED TO LIVING ALONE: Z60.2

## 2023-05-26 ENCOUNTER — HOSPITAL ENCOUNTER (EMERGENCY)
Dept: HOSPITAL 54 - ER | Age: 53
LOS: 1 days | Discharge: HOME | End: 2023-05-27
Payer: MEDICAID

## 2023-05-26 VITALS — BODY MASS INDEX: 28.56 KG/M2 | HEIGHT: 71 IN | WEIGHT: 204 LBS

## 2023-05-26 DIAGNOSIS — F17.200: ICD-10-CM

## 2023-05-26 DIAGNOSIS — J44.9: ICD-10-CM

## 2023-05-26 DIAGNOSIS — Z79.899: ICD-10-CM

## 2023-05-26 DIAGNOSIS — Z98.890: ICD-10-CM

## 2023-05-26 DIAGNOSIS — Z60.2: ICD-10-CM

## 2023-05-26 DIAGNOSIS — J98.01: Primary | ICD-10-CM

## 2023-05-26 SDOH — SOCIAL STABILITY - SOCIAL INSECURITY: PROBLEMS RELATED TO LIVING ALONE: Z60.2

## 2023-05-27 VITALS — SYSTOLIC BLOOD PRESSURE: 126 MMHG | DIASTOLIC BLOOD PRESSURE: 79 MMHG

## 2023-05-29 ENCOUNTER — HOSPITAL ENCOUNTER (INPATIENT)
Dept: HOSPITAL 54 - ER | Age: 53
LOS: 2 days | Discharge: HOME | DRG: 140 | End: 2023-05-31
Attending: INTERNAL MEDICINE | Admitting: NURSE PRACTITIONER
Payer: MEDICAID

## 2023-05-29 VITALS — HEIGHT: 68 IN | BODY MASS INDEX: 28.04 KG/M2 | WEIGHT: 185 LBS

## 2023-05-29 DIAGNOSIS — Z79.51: ICD-10-CM

## 2023-05-29 DIAGNOSIS — Z79.899: ICD-10-CM

## 2023-05-29 DIAGNOSIS — Z83.3: ICD-10-CM

## 2023-05-29 DIAGNOSIS — Z98.890: ICD-10-CM

## 2023-05-29 DIAGNOSIS — Z20.822: ICD-10-CM

## 2023-05-29 DIAGNOSIS — J44.1: Primary | ICD-10-CM

## 2023-05-29 DIAGNOSIS — J96.01: ICD-10-CM

## 2023-05-29 DIAGNOSIS — Z87.891: ICD-10-CM

## 2023-05-29 LAB
ALBUMIN SERPL BCP-MCNC: 3.4 G/DL (ref 3.4–5)
ALP SERPL-CCNC: 81 U/L (ref 46–116)
ALT SERPL W P-5'-P-CCNC: 26 U/L (ref 12–78)
AST SERPL W P-5'-P-CCNC: 14 U/L (ref 15–37)
BASOPHILS # BLD AUTO: 0.1 K/UL (ref 0–0.2)
BASOPHILS NFR BLD AUTO: 1 % (ref 0–2)
BILIRUB DIRECT SERPL-MCNC: 0.1 MG/DL (ref 0–0.2)
BILIRUB SERPL-MCNC: 0.4 MG/DL (ref 0.2–1)
BUN SERPL-MCNC: 14 MG/DL (ref 7–18)
CALCIUM SERPL-MCNC: 9.3 MG/DL (ref 8.5–10.1)
CHLORIDE SERPL-SCNC: 106 MMOL/L (ref 98–107)
CO2 SERPL-SCNC: 28 MMOL/L (ref 21–32)
CREAT SERPL-MCNC: 0.9 MG/DL (ref 0.6–1.3)
EOSINOPHIL NFR BLD AUTO: 9 % (ref 0–6)
GLUCOSE SERPL-MCNC: 137 MG/DL (ref 74–106)
HCT VFR BLD AUTO: 48 % (ref 39–51)
HGB BLD-MCNC: 15.5 G/DL (ref 13.5–17.5)
LYMPHOCYTES NFR BLD AUTO: 0.8 K/UL (ref 0.8–4.8)
LYMPHOCYTES NFR BLD AUTO: 10.4 % (ref 20–44)
MCHC RBC AUTO-ENTMCNC: 33 G/DL (ref 31–36)
MCV RBC AUTO: 88 FL (ref 80–96)
MONOCYTES NFR BLD AUTO: 0.4 K/UL (ref 0.1–1.3)
MONOCYTES NFR BLD AUTO: 5.8 % (ref 2–12)
NEUTROPHILS # BLD AUTO: 5.7 K/UL (ref 1.8–8.9)
NEUTROPHILS NFR BLD AUTO: 73.8 % (ref 43–81)
PLATELET # BLD AUTO: 330 K/UL (ref 150–450)
POTASSIUM SERPL-SCNC: 3.8 MMOL/L (ref 3.5–5.1)
PROT SERPL-MCNC: 7 G/DL (ref 6.4–8.2)
RBC # BLD AUTO: 5.43 MIL/UL (ref 4.5–6)
SODIUM SERPL-SCNC: 141 MMOL/L (ref 136–145)
WBC NRBC COR # BLD AUTO: 7.8 K/UL (ref 4.3–11)

## 2023-05-29 PROCEDURE — G0378 HOSPITAL OBSERVATION PER HR: HCPCS

## 2023-05-29 PROCEDURE — A4216 STERILE WATER/SALINE, 10 ML: HCPCS

## 2023-05-29 PROCEDURE — C9113 INJ PANTOPRAZOLE SODIUM, VIA: HCPCS

## 2023-05-29 PROCEDURE — A4223 INFUSION SUPPLIES W/O PUMP: HCPCS

## 2023-05-29 PROCEDURE — C9803 HOPD COVID-19 SPEC COLLECT: HCPCS

## 2023-05-29 RX ADMIN — Medication SCH EACH: at 22:50

## 2023-05-29 RX ADMIN — INSULIN HUMAN PRN UNITS: 100 INJECTION, SOLUTION PARENTERAL at 22:53

## 2023-05-29 NOTE — NUR
PT A/OX4 BREATHING IS LABORED, ENDORSES DYSPNEA, WHEEZING NOTED BILATERALLY ON 
AUSCULTATION. O2SAT 95% ON ROOM AIR. Hx OF COPD X2 YEARS. PT STATES HE HAS 
TAKEN HIS INHALER BUT IT DID NOT HELP HIM. VITALS ARE STABLE CONNECTED TO 
BEDSIDE ECG, BP, RR, O2 SAT PROB. PLACE IN HIGH FOWLERS SIDE RAILS UP BED 
LOCKED IN LOWEST POSTION.

## 2023-05-29 NOTE — NUR
TELE RN ADMITTING NOTE



PATIENT WAS TRANSFERRED FROM ER TO  304 - 1 AT 2105H; PATIENT IS A/O X 4, AMBULATORY, ABLE 
TO MAKE NEEDS KNOWN; STABLE ON 2LPM O2 VIA NASAL CANNULA, BREATHING EVENLY AND NO S/S OF 
DISTRESS NOTED; WITH IV ACCESS AT RAC G#20 SALINE LOCK; ORIENTED TO STAFF AND ROOM; VITAL 
SIGNS TAKEN AND RECORDED; PATIENT'S BELONGINGS ACCOUNTED FOR; HOOKED TO TELE MONITORING 
CURRENTLY READING SINUS RHYTHM 80S BPM; ENCOURAGED VERBALIZATION OF NEEDS; SAFETY MEASURES 
IMPLEMENTED, BED LOCKED IN LOWEST POSITION, SIDE RAILS UP X 3, CALL LIGHT AND TABLE WITHIN 
REACH; WILL CONTINUE TO MONITOR THROUGHOUT SHIFT

## 2023-05-30 VITALS — SYSTOLIC BLOOD PRESSURE: 156 MMHG | DIASTOLIC BLOOD PRESSURE: 85 MMHG

## 2023-05-30 VITALS — SYSTOLIC BLOOD PRESSURE: 141 MMHG | DIASTOLIC BLOOD PRESSURE: 91 MMHG

## 2023-05-30 VITALS — SYSTOLIC BLOOD PRESSURE: 141 MMHG | DIASTOLIC BLOOD PRESSURE: 89 MMHG

## 2023-05-30 VITALS — DIASTOLIC BLOOD PRESSURE: 85 MMHG | SYSTOLIC BLOOD PRESSURE: 156 MMHG

## 2023-05-30 VITALS — DIASTOLIC BLOOD PRESSURE: 93 MMHG | SYSTOLIC BLOOD PRESSURE: 151 MMHG

## 2023-05-30 VITALS — DIASTOLIC BLOOD PRESSURE: 97 MMHG | SYSTOLIC BLOOD PRESSURE: 143 MMHG

## 2023-05-30 LAB
BASE EXCESS BLDA CALC-SCNC: -1.2 MMOL/L
BASOPHILS # BLD AUTO: 0 K/UL (ref 0–0.2)
BASOPHILS NFR BLD AUTO: 0.1 % (ref 0–2)
BUN SERPL-MCNC: 14 MG/DL (ref 7–18)
CALCIUM SERPL-MCNC: 9.1 MG/DL (ref 8.5–10.1)
CHLORIDE SERPL-SCNC: 105 MMOL/L (ref 98–107)
CO2 SERPL-SCNC: 24 MMOL/L (ref 21–32)
CREAT SERPL-MCNC: 0.9 MG/DL (ref 0.6–1.3)
DO-HGB MFR BLDA: 70.9 MMHG
EOSINOPHIL NFR BLD AUTO: 0 % (ref 0–6)
GLUCOSE SERPL-MCNC: 148 MG/DL (ref 74–106)
HCT VFR BLD AUTO: 47 % (ref 39–51)
HGB BLD-MCNC: 15.6 G/DL (ref 13.5–17.5)
INHALED O2 CONCENTRATION: 25 %
INHALED O2 FLOW RATE: 1 L/MIN (ref 0–30)
LYMPHOCYTES NFR BLD AUTO: 0.4 K/UL (ref 0.8–4.8)
LYMPHOCYTES NFR BLD AUTO: 3 % (ref 20–44)
MAGNESIUM SERPL-MCNC: 2.1 MG/DL (ref 1.8–2.4)
MCHC RBC AUTO-ENTMCNC: 33 G/DL (ref 31–36)
MCV RBC AUTO: 88 FL (ref 80–96)
MONOCYTES NFR BLD AUTO: 0.1 K/UL (ref 0.1–1.3)
MONOCYTES NFR BLD AUTO: 0.6 % (ref 2–12)
NEUTROPHILS # BLD AUTO: 12.6 K/UL (ref 1.8–8.9)
NEUTROPHILS NFR BLD AUTO: 96.3 % (ref 43–81)
PCO2 TEMP ADJ BLDA: 39.3 MMHG (ref 35–45)
PH TEMP ADJ BLDA: 7.39 [PH] (ref 7.35–7.45)
PHOSPHATE SERPL-MCNC: 2.4 MG/DL (ref 2.5–4.9)
PLATELET # BLD AUTO: 351 K/UL (ref 150–450)
PO2 TEMP ADJ BLDA: 60.7 MMHG (ref 75–100)
POTASSIUM SERPL-SCNC: 4 MMOL/L (ref 3.5–5.1)
RBC # BLD AUTO: 5.37 MIL/UL (ref 4.5–6)
SAO2 % BLDA: 91.5 % (ref 92–98.5)
SODIUM SERPL-SCNC: 138 MMOL/L (ref 136–145)
VENTILATION MODE VENT: (no result)
WBC NRBC COR # BLD AUTO: 13.1 K/UL (ref 4.3–11)

## 2023-05-30 RX ADMIN — Medication PRN MG: at 07:57

## 2023-05-30 RX ADMIN — Medication PRN MG: at 00:43

## 2023-05-30 RX ADMIN — Medication SCH MG: at 13:35

## 2023-05-30 RX ADMIN — INSULIN HUMAN PRN UNITS: 100 INJECTION, SOLUTION PARENTERAL at 11:25

## 2023-05-30 RX ADMIN — Medication SCH MG: at 20:43

## 2023-05-30 RX ADMIN — Medication SCH EACH: at 11:21

## 2023-05-30 RX ADMIN — ALBUTEROL SULFATE SCH MG: 2.5 SOLUTION RESPIRATORY (INHALATION) at 20:43

## 2023-05-30 RX ADMIN — INSULIN HUMAN PRN UNITS: 100 INJECTION, SOLUTION PARENTERAL at 06:32

## 2023-05-30 RX ADMIN — ALBUTEROL SULFATE PRN MG: 2.5 SOLUTION RESPIRATORY (INHALATION) at 00:43

## 2023-05-30 RX ADMIN — ALBUTEROL SULFATE SCH MG: 2.5 SOLUTION RESPIRATORY (INHALATION) at 13:35

## 2023-05-30 RX ADMIN — ALBUTEROL SULFATE PRN MG: 2.5 SOLUTION RESPIRATORY (INHALATION) at 07:58

## 2023-05-30 RX ADMIN — ALBUTEROL SULFATE SCH MG: 2.5 SOLUTION RESPIRATORY (INHALATION) at 09:30

## 2023-05-30 RX ADMIN — INSULIN HUMAN PRN UNITS: 100 INJECTION, SOLUTION PARENTERAL at 22:43

## 2023-05-30 RX ADMIN — INSULIN HUMAN PRN UNITS: 100 INJECTION, SOLUTION PARENTERAL at 17:14

## 2023-05-30 RX ADMIN — Medication SCH EACH: at 06:30

## 2023-05-30 RX ADMIN — Medication SCH EACH: at 17:11

## 2023-05-30 RX ADMIN — Medication SCH EACH: at 22:43

## 2023-05-30 RX ADMIN — Medication SCH MG: at 09:30

## 2023-05-30 NOTE — NUR
PATIENT INSISTED THAT HE DOES NOT WANT TO PUT O2 ON ANYMORE. 

-------------------------------------------------------------------------------

Addendum: 05/30/23 at 1338 by AILYN ARZOLA RT

-------------------------------------------------------------------------------

Amended: Links added.

## 2023-05-30 NOTE — NUR
TELE RN CLOSING NOTE



PATIENT ASLEEP IN BED, EASILY AWAKEN, A/O X 4, AMBULATORY, ABLE TO MAKE NEEDS KNOWN; STABLE 
ON 1LPM O2 VIA NASAL CANNULA, BREATHING EVENLY AND NO S/S OF DISTRESS NOTED; WITH IV ACCESS 
AT RAC G#20 SALINE LOCK; HOOKED TO TELE MONITORING CURRENTLY READING SINUS RHYTHM 80S BPM; 
ADMINISTERED MEDICATIONS AS PRESCRIBED; PATIENT'S NEEDS ATTENDED; MONITORED PATIENT 
ACCORDINGLY; NO COMPLAINTS OF PAIN AND DISCOMFORT AT THIS TIME; SAFETY MEASURES IMPLEMENTED, 
BED LOCKED IN LOWEST POSITION, SIDE RAILS UP X 3, CALL LIGHT AND TABLE WITHIN REACH; HOB 
ELEVATED; WILL ENDORSE TO AM NURSE FOR GINO.

## 2023-05-30 NOTE — NUR
RN NOTE: PT BLOOD SUGAR 85. PT VERBALIZED FEELING OKAY. NO LETHARGY NOTED, SKIN COOL TO 
TOUCH. PT REFUSES TO WEAR GOWN AND INSISTS ON HAVING TORSO UNCOVERED. GIVEN 2 ORANGE JUICE 
AS A PRECAUTION.

## 2023-05-30 NOTE — NUR
RN NOTE: PT REQUESTING SLEEPING MEDICATION, SUREKHA LANDEROS ORDERED AMBIEN 5 MG QHS PRN. WILL 
ADMINISTER AS ORDERED.

## 2023-05-30 NOTE — NUR
TELE RN NOTES

RECEIVED ON BED SLEEPING,AROUSABLE TO VERBAL STIMULI,BREATHING REGULAR,NOT IN ANY FORM OF 
DISTRESS.SALINE LOCK RIGHT AC INTACT AND PATENT.DVT PUMP IN USED FOR DVT PROPHYLAXIS.SR ON 
TELE MONITOR.WILL CONTINUE TO MONITOR STATUS.CALL LIGHT IN REACH,NEEDS ANTICIPATED.

## 2023-05-30 NOTE — NUR
Patient identified with a potential need for Chronic Care Management services. Called patient to introduce self and availability of Chronic Care Management services.   Patient informed about the following service elements:  · Health information sharing - c RN NOTES: RECEIVED PT AND REPORT FROM CELESTINA MART. PT IN BED AWAKE, WATCHING TV AT THIS TIME. 
A/O X4, ABLE TO MAKE NEEDS KNOWN. ON RA WITH NO SOB OR LABORED BREATHING. CURRENTLY 
UNDERGOING BREATHING TX. DENIES PAIN AT THIS TIME. ON CARDIAC MONITOR READING SR. IV ACCESS 
RAC #20G SL, INTACT, PATENT, FLUSHING WELL. WILL CONTINUE TO MONITOR AND ASSIST.

## 2023-05-30 NOTE — NUR
TELE RN CLOSING NOTES



PATIENT IS  LYING IN BED, AWAKE, PATIENT IS A/OX4, ABLE TO MAKE HIS NEEDS KNOWN, APPEARS TO 
BE COMFORTABLE, DENIES ANY PAIN OR DISCOMFORT, PATIENT IS ON O2 AT 1 LPM VIA NASAL CANNULA, 
BREATHING EVEN AND UNLABORED, NO DISTRESS OR SOB NOTED, IV ACCESS ON RAC #20 SL, INTACT, 
PATENT AND FLUSHING WELL, PATIENT WITH EXTERNAL CARDIAC MONITOR WITH CURRENT READING OF SR 
85, NO CARDIAC DISTRESS NOTED, FALL AND SAFETY IN PLACE AND MAINTAINED AT ALL TIME, BED 
ALARM ON, BED IN LOW AND LOCK POSITION, CALL LIGHT AND TABLE WITHIN EASY REACH, SIDE RAILS 
UP X2, SCHEDULED MEDICATIONS ADMINISTERED, ALL NEEDS ATTENDED AND ANTICIPATED, WILL ENDORSED 
POC TO NIGHT SHIFT NURSE.

## 2023-05-30 NOTE — NUR
TELE RN NOTES

RT AT BEDSIDE ADMINISTERING BREATHING TREATMENT.SENSITIVE TO PERFUME,NURSE SWITCH BY CHARGE 
NURSE.REPORT GIVEN TO CELESTINA MALDONADO

## 2023-05-30 NOTE — NUR
TELE RN NOTE



PATIENT VERBALIZED HAVING SHORTNESS OF BREATH AND WITH PRESENCE OF LABORED BREATHING; 
ELEVATED HEAD OF BED; CHECKED VITAL SIGNS, SATURATING TO 96-98% ON 2LPM O2 VIA NC; RT 
NOTIFIED; ADMINISTERED DUE DOSE OF SOLU MEDROL AS ORDERED; BREATHING TREATMENT GIVEN BY RT; 
PATIENT TOLERATED WELL AND VERBALIZED FEELING BETTER; WILL CONTINUE TO MONITOR

## 2023-05-30 NOTE — NUR
TELE RN OPENING NOTES 



RECEIVED PATIENT LYING IN BED, SLEEPING, AROUSABLE TO VERBAL STIMULI, PATIENT IS A/OX4, ABLE 
TO MAKE HIS NEEDS KNOWN, APPEARS TO BE COMFORTABLE, DENIES ANY PAIN OR DISCOMFORT, PATIENT 
IS ON O2 AT 1 LPM VIA NASAL CANNULA, BREATHING EVEN AND UNLABORED, NO DISTRESS OR SOB NOTED, 
IV ACCESS ON RAC #20 SL, INTACT, PATENT AND FLUSHING WELL, PATIENT WITH EXTERNAL CARDIAC 
MONITOR WITH CURRENT READING OF SR 85, NO CARDIAC DISTRESS NOTED, FALL AND SAFETY IN PLACE, 
BED ALARM ON, BED IN LOW AND LOCK POSITION, CALL LIGHT AND TABLE WITHIN EASY REACH, SIDE 
RAILS UP X2, WILL CONTINUE TO MONITOR.

## 2023-05-30 NOTE — NUR
TELE RN NOTES

AWAKE,AMBULATE TO THE RESTROOM,WENT BACK TO BED HYPERVENTILATING.OFFERED OXYGEN BUT 
REFUSED.RT WA PAGE STAT TO ADMINISTER SCHEDULED HAND HELD BREATHING TREATMENT.

## 2023-05-30 NOTE — NUR
RN NOTES



NOTIFIED FERNANDO INGRAM REGARDING VTE SCORE OF 3, ORDERED TO ADMINISTER LOVENOX 40MG SQ Q 24 
DAILY.

## 2023-05-30 NOTE — NUR
TREATMENT GIVEN AN HOUR BEFORE THE NEW TX TIME. RN NOTIFIED.

-------------------------------------------------------------------------------

Addendum: 05/30/23 at 1035 by AILYN ARZOLA RT

-------------------------------------------------------------------------------

Amended: Links added.

## 2023-05-31 VITALS — DIASTOLIC BLOOD PRESSURE: 92 MMHG | SYSTOLIC BLOOD PRESSURE: 130 MMHG

## 2023-05-31 VITALS — DIASTOLIC BLOOD PRESSURE: 102 MMHG | SYSTOLIC BLOOD PRESSURE: 162 MMHG

## 2023-05-31 VITALS — SYSTOLIC BLOOD PRESSURE: 146 MMHG | DIASTOLIC BLOOD PRESSURE: 72 MMHG

## 2023-05-31 VITALS — SYSTOLIC BLOOD PRESSURE: 148 MMHG | DIASTOLIC BLOOD PRESSURE: 106 MMHG

## 2023-05-31 VITALS — SYSTOLIC BLOOD PRESSURE: 138 MMHG | DIASTOLIC BLOOD PRESSURE: 86 MMHG

## 2023-05-31 LAB
BASOPHILS # BLD AUTO: 0 K/UL (ref 0–0.2)
BASOPHILS NFR BLD AUTO: 0.1 % (ref 0–2)
BUN SERPL-MCNC: 25 MG/DL (ref 7–18)
CALCIUM SERPL-MCNC: 9.2 MG/DL (ref 8.5–10.1)
CHLORIDE SERPL-SCNC: 104 MMOL/L (ref 98–107)
CO2 SERPL-SCNC: 26 MMOL/L (ref 21–32)
CREAT SERPL-MCNC: 0.9 MG/DL (ref 0.6–1.3)
EOSINOPHIL NFR BLD AUTO: 0 % (ref 0–6)
GLUCOSE SERPL-MCNC: 135 MG/DL (ref 74–106)
HCT VFR BLD AUTO: 47 % (ref 39–51)
HGB BLD-MCNC: 15.6 G/DL (ref 13.5–17.5)
LYMPHOCYTES NFR BLD AUTO: 0.5 K/UL (ref 0.8–4.8)
LYMPHOCYTES NFR BLD AUTO: 2.4 % (ref 20–44)
MAGNESIUM SERPL-MCNC: 2.4 MG/DL (ref 1.8–2.4)
MCHC RBC AUTO-ENTMCNC: 33 G/DL (ref 31–36)
MCV RBC AUTO: 88 FL (ref 80–96)
MONOCYTES NFR BLD AUTO: 0.5 K/UL (ref 0.1–1.3)
MONOCYTES NFR BLD AUTO: 2.4 % (ref 2–12)
NEUTROPHILS # BLD AUTO: 20 K/UL (ref 1.8–8.9)
NEUTROPHILS NFR BLD AUTO: 95.1 % (ref 43–81)
PHOSPHATE SERPL-MCNC: 3.3 MG/DL (ref 2.5–4.9)
PLATELET # BLD AUTO: 363 K/UL (ref 150–450)
POTASSIUM SERPL-SCNC: 4.1 MMOL/L (ref 3.5–5.1)
RBC # BLD AUTO: 5.35 MIL/UL (ref 4.5–6)
SODIUM SERPL-SCNC: 140 MMOL/L (ref 136–145)
WBC NRBC COR # BLD AUTO: 21 K/UL (ref 4.3–11)

## 2023-05-31 RX ADMIN — INSULIN HUMAN PRN UNITS: 100 INJECTION, SOLUTION PARENTERAL at 11:18

## 2023-05-31 RX ADMIN — Medication SCH MG: at 14:27

## 2023-05-31 RX ADMIN — Medication SCH MG: at 02:03

## 2023-05-31 RX ADMIN — ALBUTEROL SULFATE SCH MG: 2.5 SOLUTION RESPIRATORY (INHALATION) at 14:27

## 2023-05-31 RX ADMIN — Medication SCH MG: at 07:34

## 2023-05-31 RX ADMIN — INSULIN HUMAN PRN UNITS: 100 INJECTION, SOLUTION PARENTERAL at 06:36

## 2023-05-31 RX ADMIN — ALBUTEROL SULFATE SCH MG: 2.5 SOLUTION RESPIRATORY (INHALATION) at 02:03

## 2023-05-31 RX ADMIN — Medication SCH EACH: at 11:16

## 2023-05-31 RX ADMIN — Medication SCH EACH: at 06:33

## 2023-05-31 RX ADMIN — ALBUTEROL SULFATE SCH MG: 2.5 SOLUTION RESPIRATORY (INHALATION) at 07:35

## 2023-05-31 NOTE — NUR
RN CLOSING NOTES



PATIENT DISCHARGE IN STABLE MEDICAL CONDITION, A/OX4, V/S TAKE, STABLE AND RECORDED, IV 
ACCESS RAC #20 REMOVED, DRY DRESSING APPLIED ON SITE WITH NO ACTIVE BLEEDING NOTED, NAME ARM 
BAND REMOVED, EXTERNAL CARDIAC MONITOR REMOVED AND RETURNED TO TELE DESK, NO SKIN ISSUE 
NOTED, ALL BELONGINGS CHECKED AND BELONGINGS LIST SIGNED BY PATEINT, HEALTH TEACHING AND 
DISCHARGE INSTRUCTIONS GIVEN AND VERBALIZED UNDERSTANDING, INSTRUCTED PATIENT TO MAKE AN 
APPOINTMENT WITH HIS PCP AND PULMONARY IN 1 WEEK, DISCUSSED PRESCRIPTIONS WITH PATIENT. 
INSTRUCTED PATIENT IN CASE OF EMERGENCY TO CALL 911 OR GO TO THE NEAREST ER, PATIENT LEFT 
UNIT AT 1445 VIA WHEELCHAIR WITH NO SIGNS OF DISTRESS, ACCOMPANIED BY RANDOLPH COLEMAN MD AND CHARGE NURSE AWARE OF DISCHARGE.

-------------------------------------------------------------------------------

Addendum: 05/31/23 at 1451 by ROCK GUMARO PÉREZ RN

-------------------------------------------------------------------------------

CORRECTION: RN DISCHARGE NOTES



PATIENT DISCHARGE IN STABLE MEDICAL CONDITION, A/OX4, V/S TAKE, STABLE AND RECORDED, IV 
ACCESS RAC #20 REMOVED, DRY DRESSING APPLIED ON SITE WITH NO ACTIVE BLEEDING NOTED, NAME ARM 
BAND REMOVED, EXTERNAL CARDIAC MONITOR REMOVED AND RETURNED TO TELE DESK, NO SKIN ISSUE 
NOTED, ALL BELONGINGS CHECKED AND BELONGINGS LIST SIGNED BY PATEINT, HEALTH TEACHING AND 
DISCHARGE INSTRUCTIONS GIVEN AND VERBALIZED UNDERSTANDING, INSTRUCTED PATIENT TO MAKE AN 
APPOINTMENT WITH HIS PCP AND PULMONARY IN 1 WEEK, DISCUSSED PRESCRIPTIONS WITH PATIENT. 
INSTRUCTED PATIENT IN CASE OF EMERGENCY TO CALL 911 OR GO TO THE NEAREST ER, PATIENT LEFT 
UNIT AT 1445 VIA WHEELCHAIR WITH NO SIGNS OF DISTRESS, ACCOMPANIED BY RANDOLPH COLEMAN MD AND CHARGE NURSE AWARE OF DISCHARGE. DKA

## 2023-05-31 NOTE — NUR
TELE RN OPENING NOTES 



RECEIVED PATIENT LYING IN BED, SLEEPING, AROUSABLE TO VERBAL STIMULI, PATIENT IS A/OX4, ABLE 
TO MAKE HIS NEEDS KNOWN, APPEARS TO BE COMFORTABLE, DENIES ANY PAIN OR DISCOMFORT, PATIENT 
IS ON O2 AT 1 LPM VIA NASAL CANNULA, BREATHING EVEN AND UNLABORED, NO DISTRESS OR SOB NOTED, 
IV ACCESS ON RAC #20 SL, INTACT, PATENT AND FLUSHING WELL, PATIENT WITH EXTERNAL CARDIAC 
MONITOR WITH CURRENT READING OF SR WITH BBB'S WITH A HEART RATE OF 88 BPM, NO CARDIAC 
DISTRESS NOTED, FALL AND SAFETY IN PLACE, BED ALARM ON, BED IN LOW AND LOCK POSITION, CALL 
LIGHT AND TABLE WITHIN EASY REACH, SIDE RAILS UP X2, WILL CONTINUE TO MONITOR.

## 2023-05-31 NOTE — NUR
TELE RN CLOSING NOTES



PT SLEEPING IN BED EASILY AROUSABLE. A/O X4, ABLE TO MAKE NEEDS KNOWN. STABLE ON RA WITH NO 
SOB OR LABORED BREATHING. DENIES PAIN AT THIS TIME. ON CARDIAC MONITOR READING SINUS 
TACHYCARDIA 103. IV ACCESS LFA #22G SL, INTACT, PATENT, FLUSHING WELL. ALL CARE PROVIDED AND 
MEDS TOLERATED WELL. WILL ENDORSE GINO TO DAY SHIFT NURSE.

-------------------------------------------------------------------------------

Addendum: 05/31/23 at 0739 by JOEL BECERRA RN

-------------------------------------------------------------------------------

CORRECTION: SINUS TACHYCARDIA WITH BBB, 110 HR

## 2023-08-08 ENCOUNTER — HOSPITAL ENCOUNTER (EMERGENCY)
Dept: HOSPITAL 54 - ER | Age: 53
LOS: 1 days | Discharge: HOME | End: 2023-08-09
Payer: MEDICAID

## 2023-08-08 VITALS — BODY MASS INDEX: 27.72 KG/M2 | WEIGHT: 198 LBS | HEIGHT: 71 IN

## 2023-08-08 DIAGNOSIS — J44.1: Primary | ICD-10-CM

## 2023-08-08 DIAGNOSIS — E11.9: ICD-10-CM

## 2023-08-08 DIAGNOSIS — Z79.51: ICD-10-CM

## 2023-08-08 DIAGNOSIS — Z79.899: ICD-10-CM

## 2023-08-08 DIAGNOSIS — Z60.2: ICD-10-CM

## 2023-08-08 PROCEDURE — 99285 EMERGENCY DEPT VISIT HI MDM: CPT

## 2023-08-08 PROCEDURE — 94799 UNLISTED PULMONARY SVC/PX: CPT

## 2023-08-08 PROCEDURE — 94640 AIRWAY INHALATION TREATMENT: CPT

## 2023-08-08 SDOH — SOCIAL STABILITY - SOCIAL INSECURITY: PROBLEMS RELATED TO LIVING ALONE: Z60.2

## 2023-08-09 VITALS — DIASTOLIC BLOOD PRESSURE: 87 MMHG | SYSTOLIC BLOOD PRESSURE: 126 MMHG | OXYGEN SATURATION: 97 % | TEMPERATURE: 98.5 F

## 2023-08-09 VITALS — OXYGEN SATURATION: 95 %

## 2023-08-09 VITALS — OXYGEN SATURATION: 99 %

## 2024-01-30 ENCOUNTER — HOSPITAL ENCOUNTER (EMERGENCY)
Dept: HOSPITAL 54 - ER | Age: 54
LOS: 1 days | Discharge: HOME | End: 2024-01-31
Payer: MEDICAID

## 2024-01-30 VITALS — HEIGHT: 71 IN | WEIGHT: 191 LBS | BODY MASS INDEX: 26.74 KG/M2

## 2024-01-30 VITALS — OXYGEN SATURATION: 97 %

## 2024-01-30 DIAGNOSIS — Z79.51: ICD-10-CM

## 2024-01-30 DIAGNOSIS — E11.9: ICD-10-CM

## 2024-01-30 DIAGNOSIS — J44.9: Primary | ICD-10-CM

## 2024-01-30 DIAGNOSIS — Z60.2: ICD-10-CM

## 2024-01-30 DIAGNOSIS — Z79.899: ICD-10-CM

## 2024-01-30 PROCEDURE — 99285 EMERGENCY DEPT VISIT HI MDM: CPT

## 2024-01-30 PROCEDURE — 94644 CONT INHLJ TX 1ST HOUR: CPT

## 2024-01-30 PROCEDURE — 71045 X-RAY EXAM CHEST 1 VIEW: CPT

## 2024-01-30 SDOH — SOCIAL STABILITY - SOCIAL INSECURITY: PROBLEMS RELATED TO LIVING ALONE: Z60.2

## 2024-01-31 VITALS — DIASTOLIC BLOOD PRESSURE: 102 MMHG | TEMPERATURE: 97.6 F | SYSTOLIC BLOOD PRESSURE: 143 MMHG | OXYGEN SATURATION: 99 %

## 2024-01-31 VITALS — OXYGEN SATURATION: 100 %

## 2024-09-21 ENCOUNTER — HOSPITAL ENCOUNTER (EMERGENCY)
Dept: HOSPITAL 54 - ER | Age: 54
LOS: 1 days | Discharge: HOME | End: 2024-09-22
Payer: COMMERCIAL

## 2024-09-21 VITALS — BODY MASS INDEX: 25.01 KG/M2 | HEIGHT: 68 IN | WEIGHT: 165 LBS

## 2024-09-21 DIAGNOSIS — F17.200: ICD-10-CM

## 2024-09-21 DIAGNOSIS — Z60.2: ICD-10-CM

## 2024-09-21 DIAGNOSIS — H57.89: Primary | ICD-10-CM

## 2024-09-21 DIAGNOSIS — Z79.52: ICD-10-CM

## 2024-09-21 DIAGNOSIS — J44.9: ICD-10-CM

## 2024-09-21 DIAGNOSIS — E11.9: ICD-10-CM

## 2024-09-21 DIAGNOSIS — Z98.890: ICD-10-CM

## 2024-09-21 DIAGNOSIS — Z79.899: ICD-10-CM

## 2024-09-21 RX ADMIN — FLUORESCEIN SODIUM ONE EA: 0.6 STRIP OPHTHALMIC at 22:39

## 2024-09-21 RX ADMIN — TETRACAINE HYDROCHLORIDE ONE ML: 5 SOLUTION OPHTHALMIC at 22:39

## 2024-09-21 SDOH — SOCIAL STABILITY - SOCIAL INSECURITY: PROBLEMS RELATED TO LIVING ALONE: Z60.2

## 2024-09-22 VITALS — TEMPERATURE: 98.2 F | OXYGEN SATURATION: 98 % | DIASTOLIC BLOOD PRESSURE: 88 MMHG | SYSTOLIC BLOOD PRESSURE: 137 MMHG

## 2024-09-27 ENCOUNTER — HOSPITAL ENCOUNTER (EMERGENCY)
Dept: HOSPITAL 54 - ER | Age: 54
Discharge: HOME | End: 2024-09-27
Payer: COMMERCIAL

## 2024-09-27 VITALS — BODY MASS INDEX: 26.6 KG/M2 | WEIGHT: 190 LBS | HEIGHT: 71 IN

## 2024-09-27 VITALS — SYSTOLIC BLOOD PRESSURE: 122 MMHG | OXYGEN SATURATION: 97 % | DIASTOLIC BLOOD PRESSURE: 83 MMHG

## 2024-09-27 VITALS — TEMPERATURE: 98.5 F

## 2024-09-27 DIAGNOSIS — Z60.2: ICD-10-CM

## 2024-09-27 DIAGNOSIS — Z79.52: ICD-10-CM

## 2024-09-27 DIAGNOSIS — R11.2: Primary | ICD-10-CM

## 2024-09-27 DIAGNOSIS — J44.9: ICD-10-CM

## 2024-09-27 DIAGNOSIS — Z87.891: ICD-10-CM

## 2024-09-27 DIAGNOSIS — E11.9: ICD-10-CM

## 2024-09-27 LAB
ALBUMIN SERPL BCP-MCNC: 3.8 G/DL (ref 3.4–5)
ALP SERPL-CCNC: 72 U/L (ref 46–116)
ALT SERPL W P-5'-P-CCNC: 20 U/L (ref 12–78)
AST SERPL W P-5'-P-CCNC: 17 U/L (ref 15–37)
BASOPHILS # BLD AUTO: 0.1 K/UL (ref 0–0.2)
BASOPHILS NFR BLD AUTO: 1.3 % (ref 0–2)
BILIRUB DIRECT SERPL-MCNC: 0.2 MG/DL (ref 0–0.2)
BILIRUB SERPL-MCNC: 1.2 MG/DL (ref 0.2–1)
BUN SERPL-MCNC: 18 MG/DL (ref 7–18)
CALCIUM SERPL-MCNC: 9.6 MG/DL (ref 8.5–10.1)
CHLORIDE SERPL-SCNC: 99 MMOL/L (ref 98–107)
CO2 SERPL-SCNC: 33 MMOL/L (ref 21–32)
CREAT SERPL-MCNC: 1.3 MG/DL (ref 0.6–1.3)
EOSINOPHIL # BLD AUTO: 0.2 K/UL (ref 0–0.7)
EOSINOPHIL NFR BLD AUTO: 2.9 % (ref 0–6)
ERYTHROCYTE [DISTWIDTH] IN BLOOD BY AUTOMATED COUNT: 13 % (ref 11.5–15)
GLUCOSE SERPL-MCNC: 93 MG/DL (ref 74–106)
HCT VFR BLD AUTO: 49 % (ref 39–51)
HGB BLD-MCNC: 16.5 G/DL (ref 13.5–17.5)
LIPASE SERPL-CCNC: 31 U/L (ref 16–77)
LYMPHOCYTES NFR BLD AUTO: 1.7 K/UL (ref 0.8–4.8)
LYMPHOCYTES NFR BLD AUTO: 21.9 % (ref 20–44)
MCH RBC QN AUTO: 30 PG (ref 26–33)
MCHC RBC AUTO-ENTMCNC: 33 G/DL (ref 31–36)
MCV RBC AUTO: 89 FL (ref 80–96)
MONOCYTES NFR BLD AUTO: 0.8 K/UL (ref 0.1–1.3)
MONOCYTES NFR BLD AUTO: 10.4 % (ref 2–12)
NEUTROPHILS # BLD AUTO: 4.9 K/UL (ref 1.8–8.9)
NEUTROPHILS NFR BLD AUTO: 63.5 % (ref 43–81)
PLATELET # BLD AUTO: 351 K/UL (ref 150–450)
POTASSIUM SERPL-SCNC: 4.1 MMOL/L (ref 3.5–5.1)
PROT SERPL-MCNC: 7.6 G/DL (ref 6.4–8.2)
RBC # BLD AUTO: 5.57 MIL/UL (ref 4.5–6)
SODIUM SERPL-SCNC: 137 MMOL/L (ref 136–145)
WBC NRBC COR # BLD AUTO: 7.7 K/UL (ref 4.3–11)

## 2024-09-27 PROCEDURE — 74176 CT ABD & PELVIS W/O CONTRAST: CPT

## 2024-09-27 PROCEDURE — 80076 HEPATIC FUNCTION PANEL: CPT

## 2024-09-27 PROCEDURE — 36415 COLL VENOUS BLD VENIPUNCTURE: CPT

## 2024-09-27 PROCEDURE — 85025 COMPLETE CBC W/AUTO DIFF WBC: CPT

## 2024-09-27 PROCEDURE — 83690 ASSAY OF LIPASE: CPT

## 2024-09-27 PROCEDURE — 96361 HYDRATE IV INFUSION ADD-ON: CPT

## 2024-09-27 PROCEDURE — 96374 THER/PROPH/DIAG INJ IV PUSH: CPT

## 2024-09-27 PROCEDURE — 99285 EMERGENCY DEPT VISIT HI MDM: CPT

## 2024-09-27 PROCEDURE — 80048 BASIC METABOLIC PNL TOTAL CA: CPT

## 2024-09-27 RX ADMIN — SODIUM CHLORIDE ONE ML: 9 INJECTION, SOLUTION INTRAVENOUS at 19:05

## 2024-09-27 RX ADMIN — DEXTROSE MONOHYDRATE ONE MG: 50 INJECTION, SOLUTION INTRAVENOUS at 19:10

## 2024-09-27 SDOH — SOCIAL STABILITY - SOCIAL INSECURITY: PROBLEMS RELATED TO LIVING ALONE: Z60.2

## 2024-11-14 ENCOUNTER — HOSPITAL ENCOUNTER (EMERGENCY)
Dept: HOSPITAL 54 - ER | Age: 54
Discharge: HOME | End: 2024-11-14
Payer: COMMERCIAL

## 2024-11-14 VITALS — SYSTOLIC BLOOD PRESSURE: 131 MMHG | OXYGEN SATURATION: 96 % | TEMPERATURE: 98.3 F | DIASTOLIC BLOOD PRESSURE: 89 MMHG

## 2024-11-14 VITALS — WEIGHT: 180 LBS | BODY MASS INDEX: 25.2 KG/M2 | HEIGHT: 71 IN
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
## 2024-11-14 DIAGNOSIS — Z60.2: ICD-10-CM
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
## 2024-11-14 DIAGNOSIS — R03.0: ICD-10-CM

## 2024-11-14 DIAGNOSIS — F17.200: ICD-10-CM
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
## 2024-11-14 DIAGNOSIS — J44.9: ICD-10-CM

## 2024-11-14 DIAGNOSIS — E11.9: ICD-10-CM
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
## 2024-11-14 DIAGNOSIS — S43.492A: Primary | ICD-10-CM

## 2024-11-14 DIAGNOSIS — Z79.52: ICD-10-CM

## 2024-11-14 RX ADMIN — IBUPROFEN ONE MG: 400 TABLET, FILM COATED ORAL at 03:35

## 2024-11-14 SDOH — SOCIAL STABILITY - SOCIAL INSECURITY: PROBLEMS RELATED TO LIVING ALONE: Z60.2
